# Patient Record
Sex: FEMALE | Race: WHITE | NOT HISPANIC OR LATINO | URBAN - METROPOLITAN AREA
[De-identification: names, ages, dates, MRNs, and addresses within clinical notes are randomized per-mention and may not be internally consistent; named-entity substitution may affect disease eponyms.]

---

## 2018-01-30 ENCOUNTER — OFFICE VISIT CONVERTED (OUTPATIENT)
Dept: PODIATRY | Facility: CLINIC | Age: 58
End: 2018-01-30
Attending: PODIATRIST

## 2018-02-13 ENCOUNTER — CONVERSION ENCOUNTER (OUTPATIENT)
Dept: PODIATRY | Facility: CLINIC | Age: 58
End: 2018-02-13

## 2018-02-13 ENCOUNTER — OFFICE VISIT CONVERTED (OUTPATIENT)
Dept: PODIATRY | Facility: CLINIC | Age: 58
End: 2018-02-13
Attending: PODIATRIST

## 2021-05-16 VITALS — OXYGEN SATURATION: 93 % | HEART RATE: 90 BPM | WEIGHT: 146 LBS | HEIGHT: 60 IN | BODY MASS INDEX: 28.66 KG/M2

## 2021-05-16 VITALS — HEART RATE: 94 BPM | BODY MASS INDEX: 27.05 KG/M2 | OXYGEN SATURATION: 92 % | HEIGHT: 62 IN | WEIGHT: 147 LBS

## 2024-01-05 NOTE — PROGRESS NOTES
Chief Complaint  Establish Care, Hypertension, and Depression    Subjective          Talia Reilly, 63 y.o. female presents to [facility]  History of Present Illness   As a new patient to establish care.  She is a previous patient of Dr. SARMAD Rodrigez.  She is accompanied by her , James Reilly.  Her O2 sats were 85% on arrival.  She is short of breath on exertion.  We placed oxygen at 2 L per nasal cannula.  Her O2 sats improved to 97%.  She has COPD.  She uses Anoro inhaler daily and albuterol inhaler only as needed.  She is a former smoker.    Hypertension: Her blood pressure stable on lisinopril 20 mg twice daily and carvedilol 12.5 mg twice daily.    Depression: Her PHQ-9 score today was 17.  She has had suicidal thoughts in the past but she denies any suicidal thoughts or plans now.  She states that she lost her only son a few years ago and that gets her down and depressed at times.  She does currently take fluoxetine 20 mg 3 capsules daily and she is on Abilify 5 mg 2 capsules daily.    She has chronic swelling of her right foot.  She states that Dr. Rodrigez had checked a uric acid and it was a little elevated and told her it was gout.  She denies pain in her foot though.    Previous lab work 6 months ago was reviewed and her GFR was noted to be 38.  She denies any known history of kidney disease.    PHQ-9 Depression Screening  Little interest or pleasure in doing things? 3-->nearly every day   Feeling down, depressed, or hopeless? 1-->several days   Trouble falling or staying asleep, or sleeping too much? 3-->nearly every day (not sleeping)   Feeling tired or having little energy? 3-->nearly every day   Poor appetite or overeating? 0-->not at all   Feeling bad about yourself - or that you are a failure or have let yourself or your family down? 3-->nearly every day   Trouble concentrating on things, such as reading the newspaper or watching television? 0-->not at all  "  Moving or speaking so slowly that other people could have noticed? Or the opposite - being so fidgety or restless that you have been moving around a lot more than usual? 3-->nearly every day (fidgety)   Thoughts that you would be better off dead, or of hurting yourself in some way? 1-->several days   PHQ-9 Total Score 17   If you checked off any problems, how difficult have these problems made it for you to do your work, take care of things at home, or get along with other people?            Tobacco Use: Medium Risk (1/8/2024)    Patient History     Smoking Tobacco Use: Former     Smokeless Tobacco Use: Never     Passive Exposure: Not on file      Objective   Vital Signs:   /82   Pulse 77   Temp 98.4 °F (36.9 °C)   Ht 157.5 cm (62\")   Wt 59.2 kg (130 lb 9.6 oz)   SpO2 97%   BMI 23.89 kg/m²       Current Outpatient Medications:     albuterol sulfate  (90 Base) MCG/ACT inhaler, Inhale 1 puff Every 4 (Four) Hours As Needed for Wheezing., Disp: , Rfl:     ARIPiprazole (ABILIFY) 10 MG tablet, Take 1 tablet by mouth Daily., Disp: 90 tablet, Rfl: 1    carvedilol (COREG) 12.5 MG tablet, Take 1 tablet by mouth 2 (Two) Times a Day With Meals., Disp: 180 tablet, Rfl: 1    FLUoxetine (PROzac) 20 MG capsule, Take 3 capsules by mouth Daily., Disp: 270 capsule, Rfl: 1    lisinopril (PRINIVIL,ZESTRIL) 20 MG tablet, Take 1 tablet by mouth 2 (Two) Times a Day., Disp: 180 tablet, Rfl: 1    Omega-3 Fatty Acids (OMEGA-3 PO), Take  by mouth., Disp: , Rfl:     Umeclidinium-Vilanterol (Anoro Ellipta) 62.5-25 MCG/ACT aerosol powder  inhaler, Inhale 1 puff Daily., Disp: 60 each, Rfl: 5   Past Medical History:   Diagnosis Date    Anxiety     COPD (chronic obstructive pulmonary disease) 1/8/2024    Depression     Hyperlipidemia     Hypertension     Scoliosis       Physical Exam  Vitals reviewed.   Constitutional:       Appearance: Normal appearance. She is well-developed.   Neck:      Thyroid: No thyroid mass, thyromegaly " or thyroid tenderness.   Cardiovascular:      Rate and Rhythm: Normal rate and regular rhythm.      Heart sounds: No murmur heard.     No friction rub. No gallop.   Pulmonary:      Effort: Pulmonary effort is normal.      Breath sounds: Examination of the right-lower field reveals rales. Rales present. No wheezing or rhonchi.   Musculoskeletal:      Right foot: Swelling present. No tenderness.   Lymphadenopathy:      Cervical: No cervical adenopathy.   Skin:     General: Skin is warm and dry.   Neurological:      Mental Status: She is alert and oriented to person, place, and time.      Cranial Nerves: No cranial nerve deficit.   Psychiatric:         Mood and Affect: Mood and affect normal.         Behavior: Behavior normal.         Thought Content: Thought content normal. Thought content does not include homicidal or suicidal ideation.         Judgment: Judgment normal.        Result Review :   {The following data was reviewed by DEANNA Pisano    CMP   CMP          7/19/2023    08:31   CMP   Glucose 102    BUN 21    Creatinine 1.54    EGFR 38.3    Sodium 138    Potassium 4.8    Chloride 102    Calcium 10.0    Total Protein 6.5    Albumin 4.3    Globulin 2.2    Total Bilirubin 0.3    Alkaline Phosphatase 66    AST (SGOT) 21    ALT (SGPT) 15    Albumin/Globulin Ratio 2.0    BUN/Creatinine Ratio 13.6    Anion Gap 10.6      CBC   CBC          7/19/2023    08:31   CBC   WBC 8.12    RBC 4.25    Hemoglobin 13.2    Hematocrit 39.6    MCV 93.2    MCH 31.1    MCHC 33.3    RDW 12.1    Platelets 216      LIPID   Lipid Panel          7/19/2023    08:31   Lipid Panel   Total Cholesterol 231    Triglycerides 64    HDL Cholesterol 108    VLDL Cholesterol 11    LDL Cholesterol  112    LDL/HDL Ratio 1.02      URACID   Uric Acid          7/19/2023    08:31   Common Labsle   Uric Acid 7.6             Assessment and Plan    Diagnoses and all orders for this visit:    1. Hypoxia (Primary)  Comments:  Due to hypoxia and  presentation, will have her get a CXR, will call with results. Orders for home O2 placed today. Advised to go to ER if worsening of symptoms.  Orders:  -     XR Chest PA & Lateral; Future  -     Oxygen Therapy    2. Chronic obstructive pulmonary disease, unspecified COPD type  Assessment & Plan:  She is currently using Anoro daily, albuterol as needed.  She is hypoxic on arrival today and was placed on O2 at 2 L per nasal cannula, O2 sats improved to 97% on 2 L.      Orders:  -     Umeclidinium-Vilanterol (Anoro Ellipta) 62.5-25 MCG/ACT aerosol powder  inhaler; Inhale 1 puff Daily.  Dispense: 60 each; Refill: 5  -     XR Chest PA & Lateral; Future  -     Oxygen Therapy    3. Primary hypertension  Assessment & Plan:  Hypertension is improving with treatment.  Continue current treatment regimen.  Continue current medications.  Blood pressure will be reassessed in 3 months.    Orders:  -     CBC Auto Differential  -     Comprehensive Metabolic Panel  -     Lipid Panel  -     lisinopril (PRINIVIL,ZESTRIL) 20 MG tablet; Take 1 tablet by mouth 2 (Two) Times a Day.  Dispense: 180 tablet; Refill: 1  -     carvedilol (COREG) 12.5 MG tablet; Take 1 tablet by mouth 2 (Two) Times a Day With Meals.  Dispense: 180 tablet; Refill: 1  -     TSH+Free T4    4. Localized swelling of right foot    5. Elevated uric acid in blood  Assessment & Plan:  Will recheck uric acid with labs today.     Orders:  -     Uric Acid    6. Decreased GFR  -     Comprehensive Metabolic Panel    7. Major depressive disorder, recurrent, moderate  Assessment & Plan:  Patient's depression is recurrent and is moderate without psychosis. Their depression is currently active and the condition is improving with treatment. This will be reassessed in 3 months. F/U as described:patient will continue current medication therapy.    Orders:  -     ARIPiprazole (ABILIFY) 10 MG tablet; Take 1 tablet by mouth Daily.  Dispense: 90 tablet; Refill: 1  -     FLUoxetine  (PROzac) 20 MG capsule; Take 3 capsules by mouth Daily.  Dispense: 270 capsule; Refill: 1  -     TSH+Free T4    8. Need for hepatitis C screening test  -     Hepatitis C Antibody    9. Shortness of breath  -     XR Chest PA & Lateral; Future  -     Oxygen Therapy        Follow Up   Return in about 3 months (around 4/8/2024) for 30 min apt for complex pt.  Patient was given instructions and counseling regarding her condition or for health maintenance advice. Please see specific information pulled into the AVS if appropriate.     Parts of this note are electronic transcriptions/translations of spoken language to printed text using the Dragon Dictation system.      Trinh Soto, APRN  01/08/2024

## 2024-01-08 ENCOUNTER — OFFICE VISIT (OUTPATIENT)
Dept: FAMILY MEDICINE CLINIC | Facility: CLINIC | Age: 64
End: 2024-01-08
Payer: COMMERCIAL

## 2024-01-08 VITALS
HEIGHT: 62 IN | TEMPERATURE: 98.4 F | WEIGHT: 130.6 LBS | BODY MASS INDEX: 24.03 KG/M2 | HEART RATE: 77 BPM | DIASTOLIC BLOOD PRESSURE: 82 MMHG | OXYGEN SATURATION: 97 % | SYSTOLIC BLOOD PRESSURE: 138 MMHG

## 2024-01-08 DIAGNOSIS — R06.02 SHORTNESS OF BREATH: ICD-10-CM

## 2024-01-08 DIAGNOSIS — Z11.59 NEED FOR HEPATITIS C SCREENING TEST: ICD-10-CM

## 2024-01-08 DIAGNOSIS — I10 PRIMARY HYPERTENSION: ICD-10-CM

## 2024-01-08 DIAGNOSIS — R09.02 HYPOXIA: Primary | ICD-10-CM

## 2024-01-08 DIAGNOSIS — R22.41 LOCALIZED SWELLING OF RIGHT FOOT: ICD-10-CM

## 2024-01-08 DIAGNOSIS — J44.9 CHRONIC OBSTRUCTIVE PULMONARY DISEASE, UNSPECIFIED COPD TYPE: ICD-10-CM

## 2024-01-08 DIAGNOSIS — R94.4 DECREASED GFR: ICD-10-CM

## 2024-01-08 DIAGNOSIS — E79.0 ELEVATED URIC ACID IN BLOOD: ICD-10-CM

## 2024-01-08 DIAGNOSIS — F33.1 MAJOR DEPRESSIVE DISORDER, RECURRENT, MODERATE: ICD-10-CM

## 2024-01-08 PROBLEM — M77.50 BURSITIS OF FOOT: Status: ACTIVE | Noted: 2018-02-13

## 2024-01-08 LAB
ALBUMIN SERPL-MCNC: 4.1 G/DL (ref 3.5–5.2)
ALBUMIN/GLOB SERPL: 1.4 G/DL
ALP SERPL-CCNC: 195 U/L (ref 39–117)
ALT SERPL W P-5'-P-CCNC: 33 U/L (ref 1–33)
ANION GAP SERPL CALCULATED.3IONS-SCNC: 15.1 MMOL/L (ref 5–15)
AST SERPL-CCNC: 58 U/L (ref 1–32)
BASOPHILS # BLD AUTO: 0.05 10*3/MM3 (ref 0–0.2)
BASOPHILS NFR BLD AUTO: 0.4 % (ref 0–1.5)
BILIRUB SERPL-MCNC: 0.4 MG/DL (ref 0–1.2)
BUN SERPL-MCNC: 21 MG/DL (ref 8–23)
BUN/CREAT SERPL: 13.3 (ref 7–25)
CALCIUM SPEC-SCNC: 9.7 MG/DL (ref 8.6–10.5)
CHLORIDE SERPL-SCNC: 99 MMOL/L (ref 98–107)
CHOLEST SERPL-MCNC: 224 MG/DL (ref 0–200)
CO2 SERPL-SCNC: 22.9 MMOL/L (ref 22–29)
CREAT SERPL-MCNC: 1.58 MG/DL (ref 0.57–1)
DEPRECATED RDW RBC AUTO: 45.5 FL (ref 37–54)
EGFRCR SERPLBLD CKD-EPI 2021: 36.9 ML/MIN/1.73
EOSINOPHIL # BLD AUTO: 0.17 10*3/MM3 (ref 0–0.4)
EOSINOPHIL NFR BLD AUTO: 1.5 % (ref 0.3–6.2)
ERYTHROCYTE [DISTWIDTH] IN BLOOD BY AUTOMATED COUNT: 13.9 % (ref 12.3–15.4)
GLOBULIN UR ELPH-MCNC: 3 GM/DL
GLUCOSE SERPL-MCNC: 97 MG/DL (ref 65–99)
HCT VFR BLD AUTO: 33.1 % (ref 34–46.6)
HCV AB SER DONR QL: NORMAL
HDLC SERPL-MCNC: 109 MG/DL (ref 40–60)
HGB BLD-MCNC: 10.9 G/DL (ref 12–15.9)
IMM GRANULOCYTES # BLD AUTO: 0.14 10*3/MM3 (ref 0–0.05)
IMM GRANULOCYTES NFR BLD AUTO: 1.2 % (ref 0–0.5)
LDLC SERPL CALC-MCNC: 105 MG/DL (ref 0–100)
LDLC/HDLC SERPL: 0.95 {RATIO}
LYMPHOCYTES # BLD AUTO: 1.47 10*3/MM3 (ref 0.7–3.1)
LYMPHOCYTES NFR BLD AUTO: 12.8 % (ref 19.6–45.3)
MCH RBC QN AUTO: 30 PG (ref 26.6–33)
MCHC RBC AUTO-ENTMCNC: 32.9 G/DL (ref 31.5–35.7)
MCV RBC AUTO: 91.2 FL (ref 79–97)
MONOCYTES # BLD AUTO: 0.56 10*3/MM3 (ref 0.1–0.9)
MONOCYTES NFR BLD AUTO: 4.9 % (ref 5–12)
NEUTROPHILS NFR BLD AUTO: 79.2 % (ref 42.7–76)
NEUTROPHILS NFR BLD AUTO: 9.07 10*3/MM3 (ref 1.7–7)
NRBC BLD AUTO-RTO: 0 /100 WBC (ref 0–0.2)
PLATELET # BLD AUTO: 278 10*3/MM3 (ref 140–450)
PMV BLD AUTO: 11.8 FL (ref 6–12)
POTASSIUM SERPL-SCNC: 5.2 MMOL/L (ref 3.5–5.2)
PROT SERPL-MCNC: 7.1 G/DL (ref 6–8.5)
RBC # BLD AUTO: 3.63 10*6/MM3 (ref 3.77–5.28)
SODIUM SERPL-SCNC: 137 MMOL/L (ref 136–145)
T4 FREE SERPL-MCNC: 1.41 NG/DL (ref 0.93–1.7)
TRIGL SERPL-MCNC: 58 MG/DL (ref 0–150)
TSH SERPL DL<=0.05 MIU/L-ACNC: 1.33 UIU/ML (ref 0.27–4.2)
URATE SERPL-MCNC: 10 MG/DL (ref 2.4–5.7)
VLDLC SERPL-MCNC: 10 MG/DL (ref 5–40)
WBC NRBC COR # BLD AUTO: 11.46 10*3/MM3 (ref 3.4–10.8)

## 2024-01-08 PROCEDURE — 80053 COMPREHEN METABOLIC PANEL: CPT | Performed by: NURSE PRACTITIONER

## 2024-01-08 PROCEDURE — 84439 ASSAY OF FREE THYROXINE: CPT | Performed by: NURSE PRACTITIONER

## 2024-01-08 PROCEDURE — 99204 OFFICE O/P NEW MOD 45 MIN: CPT | Performed by: NURSE PRACTITIONER

## 2024-01-08 PROCEDURE — 85025 COMPLETE CBC W/AUTO DIFF WBC: CPT | Performed by: NURSE PRACTITIONER

## 2024-01-08 PROCEDURE — 80061 LIPID PANEL: CPT | Performed by: NURSE PRACTITIONER

## 2024-01-08 PROCEDURE — 84550 ASSAY OF BLOOD/URIC ACID: CPT | Performed by: NURSE PRACTITIONER

## 2024-01-08 PROCEDURE — 36415 COLL VENOUS BLD VENIPUNCTURE: CPT | Performed by: NURSE PRACTITIONER

## 2024-01-08 PROCEDURE — 84443 ASSAY THYROID STIM HORMONE: CPT | Performed by: NURSE PRACTITIONER

## 2024-01-08 PROCEDURE — 86803 HEPATITIS C AB TEST: CPT | Performed by: NURSE PRACTITIONER

## 2024-01-08 RX ORDER — ARIPIPRAZOLE 5 MG/1
5 TABLET ORAL 2 TIMES DAILY
COMMUNITY
End: 2024-01-08 | Stop reason: SDUPTHER

## 2024-01-08 RX ORDER — LISINOPRIL 20 MG/1
20 TABLET ORAL 2 TIMES DAILY
COMMUNITY
End: 2024-01-08 | Stop reason: SDUPTHER

## 2024-01-08 RX ORDER — CARVEDILOL 12.5 MG/1
12.5 TABLET ORAL 2 TIMES DAILY WITH MEALS
Qty: 180 TABLET | Refills: 1 | Status: SHIPPED | OUTPATIENT
Start: 2024-01-08

## 2024-01-08 RX ORDER — ARIPIPRAZOLE 10 MG/1
10 TABLET ORAL DAILY
Qty: 90 TABLET | Refills: 1 | Status: SHIPPED | OUTPATIENT
Start: 2024-01-08

## 2024-01-08 RX ORDER — ALBUTEROL SULFATE 90 UG/1
1 AEROSOL, METERED RESPIRATORY (INHALATION) EVERY 4 HOURS PRN
COMMUNITY

## 2024-01-08 RX ORDER — CARVEDILOL 12.5 MG/1
12.5 TABLET ORAL 2 TIMES DAILY WITH MEALS
COMMUNITY
End: 2024-01-08 | Stop reason: SDUPTHER

## 2024-01-08 RX ORDER — UMECLIDINIUM BROMIDE AND VILANTEROL TRIFENATATE 62.5; 25 UG/1; UG/1
1 POWDER RESPIRATORY (INHALATION)
Qty: 60 EACH | Refills: 5 | Status: SHIPPED | OUTPATIENT
Start: 2024-01-08

## 2024-01-08 RX ORDER — LISINOPRIL 20 MG/1
20 TABLET ORAL 2 TIMES DAILY
Qty: 180 TABLET | Refills: 1 | Status: SHIPPED | OUTPATIENT
Start: 2024-01-08

## 2024-01-08 RX ORDER — FLUOXETINE HYDROCHLORIDE 20 MG/1
60 CAPSULE ORAL DAILY
Qty: 270 CAPSULE | Refills: 1 | Status: SHIPPED | OUTPATIENT
Start: 2024-01-08

## 2024-01-08 RX ORDER — FLUOXETINE HYDROCHLORIDE 20 MG/1
20 CAPSULE ORAL 3 TIMES DAILY
COMMUNITY
End: 2024-01-08 | Stop reason: SDUPTHER

## 2024-01-09 ENCOUNTER — TELEPHONE (OUTPATIENT)
Dept: FAMILY MEDICINE CLINIC | Facility: CLINIC | Age: 64
End: 2024-01-09
Payer: COMMERCIAL

## 2024-01-09 NOTE — TELEPHONE ENCOUNTER
Caller: CARLEY ROY    Relationship: Emergency Contact    Best call back number: [phone]    What was the call regarding: PATIENTS SPOUSE STATES THAT THE HOSPITAL WILL ARRANGE HOME OXYGEN FOR PATIENT.

## 2024-01-09 NOTE — ASSESSMENT & PLAN NOTE
She is currently using Anoro daily, albuterol as needed.  She is hypoxic on arrival today and was placed on O2 at 2 L per nasal cannula, O2 sats improved to 97% on 2 L.

## 2024-01-09 NOTE — TELEPHONE ENCOUNTER
So my worry is if we close this order then pt wont have o2 to go home with. I have talked to pt's  twice today before we saw this message due to it being sent to the wrong office. And silver stopped by the office stating they just needed the walk test from hospital and they would make sure she got o2 at home. We told pt to tell the nurse. Tamra is aware but adding her so she can put in a ticket for it being routed to the wrong office

## 2024-01-09 NOTE — TELEPHONE ENCOUNTER
Patient got admitted after I ordered the oxygen.  The oxygen will be ordered now through the hospital.  We can cancel the order that I put in.

## 2024-01-09 NOTE — TELEPHONE ENCOUNTER
PER LUCIE FROM [facility] [place] PATIENT NEEDS EITHER 6 MINUTE WALK TEST OR OVERNIGHT OX TEST

## 2024-01-10 NOTE — TELEPHONE ENCOUNTER
Patient  called states they are on their way to [facility] with patient per EMS. States they feel she may have some blockages and they transferred her there.

## 2024-01-10 NOTE — TELEPHONE ENCOUNTER
They need to do the test in the hospital because she will need to go home on oxygen and not wait to have to do an overnight pulse ox at home. My notes does show that her oxygen dropped to 85% when she first got to our office, placed on O2 at 2 L and sats improved to 97%. Please advise Lila of the above and they can contact the hospital also.

## 2024-01-10 NOTE — TELEPHONE ENCOUNTER
called back states she was admitted to [facility] and he will call us back when she is d/c and they would like to do an overnight pulse oxmetry test for 02

## 2024-01-18 ENCOUNTER — OFFICE VISIT (OUTPATIENT)
Dept: FAMILY MEDICINE CLINIC | Facility: CLINIC | Age: 64
End: 2024-01-18
Payer: COMMERCIAL

## 2024-01-18 VITALS
DIASTOLIC BLOOD PRESSURE: 58 MMHG | TEMPERATURE: 97.4 F | HEART RATE: 70 BPM | HEIGHT: 62 IN | OXYGEN SATURATION: 93 % | WEIGHT: 128 LBS | BODY MASS INDEX: 23.55 KG/M2 | SYSTOLIC BLOOD PRESSURE: 122 MMHG

## 2024-01-18 DIAGNOSIS — J44.9 CHRONIC OBSTRUCTIVE PULMONARY DISEASE, UNSPECIFIED COPD TYPE: ICD-10-CM

## 2024-01-18 DIAGNOSIS — Z09 HOSPITAL DISCHARGE FOLLOW-UP: Primary | ICD-10-CM

## 2024-01-18 DIAGNOSIS — J96.91 RESPIRATORY FAILURE WITH HYPOXIA, UNSPECIFIED CHRONICITY: ICD-10-CM

## 2024-01-18 DIAGNOSIS — Z23 NEED FOR PNEUMOCOCCAL 20-VALENT CONJUGATE VACCINATION: ICD-10-CM

## 2024-01-18 DIAGNOSIS — N18.32 STAGE 3B CHRONIC KIDNEY DISEASE: ICD-10-CM

## 2024-01-18 DIAGNOSIS — Z23 NEED FOR DIPHTHERIA-TETANUS-PERTUSSIS (TDAP) VACCINE: ICD-10-CM

## 2024-01-18 PROBLEM — E78.2 MIXED HYPERLIPIDEMIA: Status: ACTIVE | Noted: 2024-01-18

## 2024-01-18 PROBLEM — J18.9 PNEUMONIA OF LEFT LOWER LOBE DUE TO INFECTIOUS ORGANISM: Status: ACTIVE | Noted: 2024-01-08

## 2024-01-18 PROBLEM — M1A.00X0 CHRONIC GOUTY ARTHRITIS: Status: ACTIVE | Noted: 2024-01-18

## 2024-01-18 PROBLEM — J44.89 COPD (CHRONIC OBSTRUCTIVE PULMONARY DISEASE) WITH CHRONIC BRONCHITIS: Status: ACTIVE | Noted: 2024-01-18

## 2024-01-18 PROBLEM — M1A.9XX0 CHRONIC GOUTY ARTHRITIS: Status: ACTIVE | Noted: 2024-01-18

## 2024-01-18 PROBLEM — F32.9 MAJOR DEPRESSIVE DISORDER, SINGLE EPISODE, UNSPECIFIED: Status: ACTIVE | Noted: 2024-01-18

## 2024-01-18 PROBLEM — J96.01 ACUTE HYPOXIC RESPIRATORY FAILURE: Status: ACTIVE | Noted: 2024-01-10

## 2024-01-18 PROBLEM — M41.9 SCOLIOSIS: Status: ACTIVE | Noted: 2024-01-18

## 2024-01-18 PROBLEM — F51.01 PRIMARY INSOMNIA: Status: ACTIVE | Noted: 2024-01-18

## 2024-01-18 RX ORDER — FUROSEMIDE 40 MG/1
40 TABLET ORAL DAILY
COMMUNITY
Start: 2024-01-14

## 2024-01-18 NOTE — PROGRESS NOTES
Chief Complaint  No chief complaint on file.    Subjective          Talia Reilly, 63 y.o. female presents to [facility]  History of Present Illness       PHQ-2 Depression Screening  Little interest or pleasure in doing things?     Feeling down, depressed, or hopeless?     PHQ-2 Total Score            Tobacco Use: Medium Risk (1/18/2024)    Patient History     Smoking Tobacco Use: Former     Smokeless Tobacco Use: Never     Passive Exposure: Not on file      Objective   Vital Signs:   There were no vitals taken for this visit.      Current Outpatient Medications:     albuterol sulfate  (90 Base) MCG/ACT inhaler, Inhale 1 puff Every 4 (Four) Hours As Needed for Wheezing., Disp: , Rfl:     ARIPiprazole (ABILIFY) 10 MG tablet, Take 1 tablet by mouth Daily., Disp: 90 tablet, Rfl: 1    carvedilol (COREG) 12.5 MG tablet, Take 1 tablet by mouth 2 (Two) Times a Day With Meals., Disp: 180 tablet, Rfl: 1    FLUoxetine (PROzac) 20 MG capsule, Take 3 capsules by mouth Daily., Disp: 270 capsule, Rfl: 1    lisinopril (PRINIVIL,ZESTRIL) 20 MG tablet, Take 1 tablet by mouth 2 (Two) Times a Day., Disp: 180 tablet, Rfl: 1    Omega-3 Fatty Acids (OMEGA-3 PO), Take  by mouth., Disp: , Rfl:     Umeclidinium-Vilanterol (Anoro Ellipta) 62.5-25 MCG/ACT aerosol powder  inhaler, Inhale 1 puff Daily., Disp: 60 each, Rfl: 5   Past Medical History:   Diagnosis Date    Anxiety     COPD (chronic obstructive pulmonary disease) 1/8/2024    Depression     Hyperlipidemia     Hypertension     Scoliosis       Physical Exam   Result Review :   {The following data was reviewed by DEANNA Pisano     Renal Bilateral    Result Date: 1/10/2024  Chronic medical renal disease but no hydronephrosis to suggest obstruction. Electronically Signed: Juan Jose Fowler MD 2024/01/10 at 9:06 CST Reading Location ID and State: [address] / [place] Tel [phone], Service support  [phone], Fax [phone]    CT Chest  Without Contrast Diagnostic    Result Date: 1/9/2024  ASHD and old granulomatous disease. Minor bibasilar atelectasis. No focal infiltration. Electronically Signed: Junaid Mcmahan MD 2024/01/09 at 20:06 CST Reading Location ID and State: [address] / [place] Tel [phone], Service support  [phone], Fax [phone]    XR Chest 1 View    Result Date: 1/8/2024  Left lower lobe infiltrate or subsegmental atelectasis Electronically Signed: Ubaldo Mccord MD 2024/01/08 at 16:06 CST Reading Location ID and State: [id number] / [place] Tel [phone], Service support  [phone], Fax [phone]                   Assessment and Plan    There are no diagnoses linked to this encounter.    Follow Up   No follow-ups on file.  Patient was given instructions and counseling regarding her condition or for health maintenance advice. Please see specific information pulled into the AVS if appropriate.     Parts of this note are electronic transcriptions/translations of spoken language to printed text using the Dragon Dictation system.      Trinh Soto, APRN  01/18/2024

## 2024-01-18 NOTE — ASSESSMENT & PLAN NOTE
Renal condition is newly identified.  Continue current treatment regimen.  Continue current medications.  Patient is going to call to make appointment with nephrologist.  Renal condition will be reassessed in 3 months.

## 2024-01-18 NOTE — ASSESSMENT & PLAN NOTE
COPD is  stable .  COPD information handout given.  Follow up in 3 months, or sooner should new symptoms or problems arise.  Referral to pulmonology.

## 2024-01-18 NOTE — PROGRESS NOTES
Transitional Care Follow Up Visit  Subjective     Talia Reilly is a 63 y.o. female who presents for a transitional care management visit.    No transition of care management phone call was done post hospitalization.     Current outpatient and discharge medications have been reconciled for the patient.  Reviewed by: DEANNA Pisano    Tobacco Use: Medium Risk (1/18/2024)    Patient History     Smoking Tobacco Use: Former     Smokeless Tobacco Use: Never     Passive Exposure: Not on file            No data to display              Risk for Readmission (LACE) No data recorded    History of Present Illness   Course During Hospital Stay:    She states she presented to the hospital to get the chest x-ray that I had ordered on 1/8/2024.  She states when she went into the bathroom she passed out.  She was found to have pneumonia and was admitted to the hospital. She first was admitted at [facility] on 1/8/2024 for pneumonia but was transferred to [facility] on 1/10/2024.    She is a fairly new patient for me.  She had first come to see me on 1/8/2024 and was having some hypoxia.  I was sending her to get a chest x-ray and had ordered home oxygen.    She states she is feeling much better since she got out of the hospital.  She does not have any more hypoxia but she is using her oxygen continuously.  She does have COPD.  She is on Anoro inhaler daily and has albuterol inhaler to use as needed she does not have a pulmonologist.    She did have episode of congestive heart failure and was started on Lasix.  She does have a follow-up with cardiologist Dr. Dodd's office in [place].  She is supposed to call the nephrologist due to her chronic kidney disease that is newly identified.  She states she has been having some swelling in her lower extremities but that had improved after starting Lasix.  She states that she has had swelling in her feet in the past and was told it was gout but she  denies any pain.  She did have an elevated uric acid but she still denies any gout attacks.    HOSPITAL NOTES REVIEWED:  Admit date: 1/10/2024    Discharge date and time: 1/14/2024    Admitting Physician: Boubacar Garsia DO     Primary Care Provider: Trinh Soto APRN    Discharge Physician: Ottoniel Miranda MD    Discharge Diagnoses/Reason for Hospitalization:   Principal Problem:  Acute hypoxic respiratory failure (1/10/2024) (POA: Unknown)    Please refer to details of hospitalization in hospital course for additional diagnoses.    Hospital Course:  1. Acute hypoxic respiratory failure due to acute systolic CHF: symptomatically improved with diuresis. 6MW today to assess need for home O2     2. Acute systolic CHF: s/p IV diuretics. Cardiology followed. [facility] with normal coronaries. Onset of symptoms in November following COVID/flu-like illness. 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 Will continue BB and ACE-I on discharge. Daily lasix prescribed on discharge     3. CKD 3: Cr in July was 1.5 in July. Nephrology followed. Renal function stable at time of discharge.        Discharge exam notable for: awake, alert, breathing comfortably    Consults:   IP CONSULT TO CARDIOLOGY  IP CONSULT TO CASE MANAGEMENT  INPATIENT CONSULT TO PHARMACY  IP CONSULT TO NEPHROLOGY    Discharge Medications:  Current Discharge Medication List     START taking these medications   Details   furosemide (LASIX) 40 MG tablet Take 1 tablet by mouth daily.  Qty: 30 tablet, Refills: 0       CONTINUE these medications which have NOT CHANGED   Details   albuterol  (90 Base) MCG/ACT inhaler Inhale 2 puffs into the lungs every 6 (six) hours as needed.     ARIPiprazole (ABILIFY) 10 MG tablet Take 10 mg by mouth daily.     carvedilol (COREG) 12.5 MG tablet Take 12.5 mg by mouth 2 (two) times daily.     FLUoxetine (PROZAC) 20 MG capsule Take 60 mg by mouth daily.     lisinopril (PRINIVIL) 20 MG tablet Take 20 mg by  mouth daily.     OMEGA-3 FATTY ACIDS PO Take by mouth.     Umeclidinium-Vilanterol (ANORO ELLIPTA) 62.5-25 MCG/ACT AEPB Inhale 1 puff into the lungs daily.     Disposition:  Home    Discharge condition: Stable    Patient Instructions:   Follow-up Information   Zulema Ribeiro APRN .   Specialties: Nurse Practitioner, Cardiology  Contact information  David BUCKNER  [facility]  [place] [id number]  [phone]   The following portions of the patient's history were reviewed and updated as appropriate: allergies, current medications, past family history, past medical history, past social history, past surgical history, and problem list.    Review of Systems   Constitutional:  Negative for chills and fever.   Respiratory:  Negative for cough, shortness of breath and wheezing.    Cardiovascular:  Negative for chest pain, palpitations and leg swelling.   Genitourinary:  Negative for difficulty urinating.   Musculoskeletal:  Negative for arthralgias.   Neurological:  Negative for dizziness and weakness.   Psychiatric/Behavioral:  Negative for dysphoric mood and suicidal ideas. The patient is not nervous/anxious.        Current Outpatient Medications:     albuterol sulfate  (90 Base) MCG/ACT inhaler, Inhale 1 puff Every 4 (Four) Hours As Needed for Wheezing., Disp: , Rfl:     ARIPiprazole (ABILIFY) 10 MG tablet, Take 1 tablet by mouth Daily., Disp: 90 tablet, Rfl: 1    carvedilol (COREG) 12.5 MG tablet, Take 1 tablet by mouth 2 (Two) Times a Day With Meals., Disp: 180 tablet, Rfl: 1    FLUoxetine (PROzac) 20 MG capsule, Take 3 capsules by mouth Daily., Disp: 270 capsule, Rfl: 1    furosemide (LASIX) 40 MG tablet, Take 1 tablet by mouth Daily., Disp: , Rfl:     lisinopril (PRINIVIL,ZESTRIL) 20 MG tablet, Take 1 tablet by mouth 2 (Two) Times a Day., Disp: 180 tablet, Rfl: 1    Omega-3 Fatty Acids (OMEGA-3 PO), Take  by mouth., Disp: , Rfl:     Umeclidinium-Vilanterol (Anoro Ellipta) 62.5-25 MCG/ACT aerosol powder   inhaler, Inhale 1 puff Daily., Disp: 60 each, Rfl: 5  Objective   Physical Exam  Vitals reviewed.   Constitutional:       Appearance: Normal appearance. She is well-developed.   Neck:      Thyroid: No thyroid mass, thyromegaly or thyroid tenderness.   Cardiovascular:      Rate and Rhythm: Normal rate and regular rhythm.      Heart sounds: No murmur heard.     No friction rub. No gallop.   Pulmonary:      Effort: Pulmonary effort is normal.      Breath sounds: Normal breath sounds. No wheezing or rhonchi.      Comments: On continuous oxygen via nasal cannula.  Lymphadenopathy:      Cervical: No cervical adenopathy.   Skin:     General: Skin is warm and dry.   Neurological:      Mental Status: She is alert and oriented to person, place, and time.      Cranial Nerves: No cranial nerve deficit.   Psychiatric:         Mood and Affect: Mood and affect normal.         Behavior: Behavior normal.         Thought Content: Thought content normal. Thought content does not include homicidal or suicidal ideation.         Judgment: Judgment normal.       Common labs          1/11/2024    04:43 1/11/2024    12:18 1/12/2024    06:13 1/13/2024    04:38   Common Labs   Albumin  3.1         WBC 7.58      6.74     5.82       Hemoglobin 9.4      9.5     9.1       Hematocrit 30.0      31.0     29.8       Platelets 196      202     190       Uric Acid  12.4            Details          This result is from an external source.             Assessment & Plan   Problems Addressed this Visit          Genitourinary and Reproductive     Stage 3b chronic kidney disease     Renal condition is newly identified.  Continue current treatment regimen.  Continue current medications.  Patient is going to call to make appointment with nephrologist.  Renal condition will be reassessed in 3 months.         Relevant Medications    furosemide (LASIX) 40 MG tablet       Pulmonary and Pneumonias    COPD (chronic obstructive pulmonary disease)     COPD is  stable  .  COPD information handout given.  Follow up in 3 months, or sooner should new symptoms or problems arise.  Referral to pulmonology.             Relevant Orders    Ambulatory Referral to Pulmonology (Completed)    Respiratory failure with hypoxia     She is currently stable on continuous home oxygen.         Relevant Orders    Ambulatory Referral to Pulmonology (Completed)     Other Visit Diagnoses       Hospital discharge follow-up    -  Primary    No TCM call was made.    Need for pneumococcal 20-valent conjugate vaccination        Relevant Orders    Pneumococcal Conjugate Vaccine 20-Valent (PCV20) (Completed)    Need for diphtheria-tetanus-pertussis (Tdap) vaccine        Relevant Orders    Tdap Vaccine => 8yo IM (BOOSTRIX) (Completed)          Diagnoses         Codes Comments    Hospital discharge follow-up    -  Primary ICD-10-CM: Z09  ICD-9-CM: V67.59 No TCM call was made.    Chronic obstructive pulmonary disease, unspecified COPD type     ICD-10-CM: J44.9  ICD-9-CM: 496     Respiratory failure with hypoxia, unspecified chronicity     ICD-10-CM: J96.91  ICD-9-CM: 518.81     Stage 3b chronic kidney disease     ICD-10-CM: N18.32  ICD-9-CM: 585.3     Need for pneumococcal 20-valent conjugate vaccination     ICD-10-CM: Z23  ICD-9-CM: V03.82     Need for diphtheria-tetanus-pertussis (Tdap) vaccine     ICD-10-CM: Z23  ICD-9-CM: V06.1           Parts of this note are electronic transcriptions/translations of spoken language to printed text using the Dragon Dictation system.    Trinh Soto, APRN  01/18/2024

## 2024-02-09 RX ORDER — FUROSEMIDE 40 MG/1
40 TABLET ORAL DAILY
Qty: 30 TABLET | Refills: 2 | Status: SHIPPED | OUTPATIENT
Start: 2024-02-09

## 2024-02-09 NOTE — TELEPHONE ENCOUNTER
Caller: Tommie Talia L    Relationship: Self    Best call back number: [phone]    Requested Prescriptions:   Requested Prescriptions     Pending Prescriptions Disp Refills    furosemide (LASIX) 40 MG tablet       Sig: Take 1 tablet by mouth Daily.        Pharmacy where request should be sent: [address] [phone] [facility] [phone]      Last office visit with prescribing clinician: 1/18/2024   Last telemedicine visit with prescribing clinician: Visit date not found   Next office visit with prescribing clinician: 4/8/2024     Additional details provided by patient:     Does the patient have less than a 3 day supply:  [x] Yes  [] No    Would you like a call back once the refill request has been completed: [x] Yes [] No    If the office needs to give you a call back, can they leave a voicemail: [x] Yes [] No    Gee Purvis   02/09/24 12:13 EST

## 2024-02-16 ENCOUNTER — TELEPHONE (OUTPATIENT)
Dept: FAMILY MEDICINE CLINIC | Facility: CLINIC | Age: 64
End: 2024-02-16
Payer: COMMERCIAL

## 2024-03-26 ENCOUNTER — TELEPHONE (OUTPATIENT)
Dept: FAMILY MEDICINE CLINIC | Facility: CLINIC | Age: 64
End: 2024-03-26

## 2024-03-26 DIAGNOSIS — N18.32 STAGE 3B CHRONIC KIDNEY DISEASE: Primary | ICD-10-CM

## 2024-03-26 DIAGNOSIS — J44.9 CHRONIC OBSTRUCTIVE PULMONARY DISEASE, UNSPECIFIED COPD TYPE: ICD-10-CM

## 2024-03-26 RX ORDER — UMECLIDINIUM BROMIDE AND VILANTEROL TRIFENATATE 62.5; 25 UG/1; UG/1
1 POWDER RESPIRATORY (INHALATION)
Qty: 60 EACH | Refills: 5 | Status: SHIPPED | OUTPATIENT
Start: 2024-03-26

## 2024-03-26 NOTE — TELEPHONE ENCOUNTER
Caller: Talia Reilly    Relationship: Self    Best call back number: [phone]     Requested Prescriptions:   ANORO INHALER 62.5 25 MG        Pharmacy where request should be sent: [facility] - [address] - [phone] [facility] [phone] FX     Last office visit with prescribing clinician: 1/18/2024   Last telemedicine visit with prescribing clinician: Visit date not found   Next office visit with prescribing clinician: 4/11/2024     Additional details provided by patient: PATIENT STATED THAT SHE IS NEEDING MARKOS OWENS TO PROVIDE REFILLS FOR THIS MEDICATION THAT WAS WAS PROVIDED BY PREVIOUS PRIMARY CARE Gee Shane Rep   03/26/24 11:57 EDT

## 2024-03-26 NOTE — TELEPHONE ENCOUNTER
Caller: Talia Reilly    Relationship: Self    Best call back number: [phone] REQUESTING A CALL BACK    What is the medical concern/diagnosis: CHRONIC KIDNEY FAILURE    What specialty or service is being requested:  KIDNEY SPECIALIST     What is the provider, practice or medical service name:   DR BARBARA ARMENTA    What is the office phone number: [phone]    Any additional details: PATIENT STATED THAT AFTER A JANUARY HOSPITALIZATION AT [facility] SHE WAS INFORMED TO SEE THIS DOCTOR AFTER SEEING HIM IN THE HOSPITAL

## 2024-04-09 ENCOUNTER — TELEPHONE (OUTPATIENT)
Dept: FAMILY MEDICINE CLINIC | Facility: CLINIC | Age: 64
End: 2024-04-09
Payer: COMMERCIAL

## 2024-04-10 NOTE — PROGRESS NOTES
"Chief Complaint   Patient presents with    RECHECK     Hand Pain       Initial /78   Pulse 82   Temp 97.7  F (36.5  C)   Resp 18   Ht 1.676 m (5' 6\")   Wt 61.8 kg (136 lb 3.2 oz)   LMP 03/07/2003 (Approximate)   SpO2 94%   BMI 21.98 kg/m   Estimated body mass index is 21.98 kg/m  as calculated from the following:    Height as of this encounter: 1.676 m (5' 6\").    Weight as of this encounter: 61.8 kg (136 lb 3.2 oz).  Medication Review: complete    The next two questions are to help us understand your food security.  If you are feeling you need any assistance in this area, we have resources available to support you today.          1/18/2024   SDOH- Food Insecurity   Within the past 12 months, did you worry that your food would run out before you got money to buy more? N   Within the past 12 months, did the food you bought just not last and you didn t have money to get more? N         Health Care Directive:  Patient does not have a Health Care Directive or Living Will: Discussed advance care planning with patient; however, patient declined at this time.    Nicky Ratliff LPN      " Chief Complaint  Hypertension, Depression, and COPD    Subjective            Talia Reilly is a 63 y.o. female who presents to [facility]   History of Present Illness  She is here today for follow-up.  She is accompanied by her .    COPD: She is on Anoro inhaler and has albuterol inhaler to use as needed.  She states she is doing well on Anoro but it is expensive for her.  She saw pulmonologist Dr. Tommie Davies with [facility]. She is on continuous O2 at 2 lpm. Her O2 sats were slightly low at 84% when she arrived.  She turned the O2 up to 3 L/min and her O2 sats improved to 96%.    She has chronic kidney disease.  She has been referred to nephrology and has an appointment coming up.    Hypertension: Her blood pressure stable on lisinopril 20 mg twice daily and carvedilol 12.5 mg twice daily.    Depression/Anxiety:  She states that she lost her only son a few years ago and that gets her down and depressed at times.  She does currently take fluoxetine 20 mg 3 capsules (60 mg) daily and she is on Abilify 10 mg daily.    She is due for colorectal screening.  She does not want to do a colonoscopy.  She does not have any family history of colon cancer and denies any issues with her bowels.    She also needs RSV and shingles vaccines.      Tobacco Use: Medium Risk (4/11/2024)    Patient History     Smoking Tobacco Use: Former     Smokeless Tobacco Use: Never     Passive Exposure: Not on file      E-cigarette/Vaping    E-cigarette/Vaping Use Never User      E-cigarette/Vaping Substances     E-cigarette/Vaping Devices       Alcohol Use: Not At Risk (1/8/2024)    Received from [facility], [facility]    AUDIT-C     Frequency of Alcohol Consumption: Never     Average Number of Drinks: Patient does not drink     Frequency of Binge Drinking: Never         Objective   Vital Signs:   Vitals:    04/11/24 1045 04/11/24 1048   BP: 152/74 140/70   BP Location: Left arm   "  Patient Position: Sitting    Cuff Size: Adult    Pulse: 72 65   Temp: 97.7 °F (36.5 °C)    SpO2: (!) 84% 96%   Weight: 60 kg (132 lb 3.2 oz)    Height: 157.5 cm (62\")      Body mass index is 24.18 kg/m².    Wt Readings from Last 3 Encounters:   04/11/24 60 kg (132 lb 3.2 oz)   01/18/24 58.1 kg (128 lb)   01/08/24 59.2 kg (130 lb 9.6 oz)     BP Readings from Last 3 Encounters:   04/11/24 140/70   01/18/24 122/58   01/08/24 138/82       Health Maintenance   Topic Date Due    COLORECTAL CANCER SCREENING  Never done    ANNUAL PHYSICAL  Never done    PAP SMEAR  Never done    COVID-19 Vaccine (1 - 2023-24 season) 01/13/2025 (Originally 9/1/2023)    MAMMOGRAM  01/18/2025 (Originally 1960)    RSV Vaccine - Adults (1 - 1-dose 60+ series) 04/11/2025 (Originally 8/14/2021)    ZOSTER VACCINE (1 of 2) 04/11/2025 (Originally 8/14/2011)    INFLUENZA VACCINE  08/01/2024    LUNG CANCER SCREENING  01/09/2025    LIPID PANEL  01/12/2025    TDAP/TD VACCINES (3 - Td or Tdap) 01/18/2034    HEPATITIS C SCREENING  Completed    Pneumococcal Vaccine 0-64  Completed       /70   Pulse 65   Temp 97.7 °F (36.5 °C)   Ht 157.5 cm (62\")   Wt 60 kg (132 lb 3.2 oz)   SpO2 96%   BMI 24.18 kg/m²       Current Outpatient Medications:     albuterol sulfate  (90 Base) MCG/ACT inhaler, Inhale 1 puff Every 4 (Four) Hours As Needed for Wheezing., Disp: , Rfl:     ARIPiprazole (ABILIFY) 10 MG tablet, Take 1 tablet by mouth Daily., Disp: 90 tablet, Rfl: 1    carvedilol (COREG) 12.5 MG tablet, Take 1 tablet by mouth 2 (Two) Times a Day With Meals., Disp: 180 tablet, Rfl: 1    FLUoxetine (PROzac) 20 MG capsule, Take 3 capsules by mouth Daily., Disp: 270 capsule, Rfl: 1    furosemide (LASIX) 40 MG tablet, Take 1 tablet by mouth Daily., Disp: 30 tablet, Rfl: 2    lisinopril (PRINIVIL,ZESTRIL) 20 MG tablet, Take 1 tablet by mouth 2 (Two) Times a Day., Disp: 180 tablet, Rfl: 1    Omega-3 Fatty Acids (OMEGA-3 PO), Take  by mouth., Disp: , " Rfl:     Umeclidinium-Vilanterol (Anoro Ellipta) 62.5-25 MCG/ACT aerosol powder  inhaler, Inhale 1 puff Daily., Disp: 60 each, Rfl: 5    hydrOXYzine (ATARAX) 25 MG tablet, Take 0.5-1 tablets by mouth 3 (Three) Times a Day As Needed for Anxiety. Indications: Feeling Anxious, Disp: 90 tablet, Rfl: 2    RSVPreF3 Vac Recomb Adjuvanted (AREXVY) 120 MCG/0.5ML reconstituted suspension injection, Inject 0.5 mL into the appropriate muscle as directed by prescriber 1 (One) Time for 1 dose., Disp: 0.5 mL, Rfl: 0   Past Medical History:   Diagnosis Date    Anxiety     COPD (chronic obstructive pulmonary disease) 1/8/2024    Depression     Hyperlipidemia     Hypertension     Scoliosis         Physical Exam  Vitals reviewed.   Constitutional:       Appearance: Normal appearance. She is well-developed.   Neck:      Thyroid: No thyroid mass, thyromegaly or thyroid tenderness.   Cardiovascular:      Rate and Rhythm: Normal rate and regular rhythm.      Heart sounds: No murmur heard.     No friction rub. No gallop.   Pulmonary:      Effort: Pulmonary effort is normal.      Breath sounds: Normal breath sounds. No wheezing or rhonchi.   Lymphadenopathy:      Cervical: No cervical adenopathy.   Skin:     General: Skin is warm and dry.   Neurological:      Mental Status: She is alert and oriented to person, place, and time.      Cranial Nerves: No cranial nerve deficit.   Psychiatric:         Mood and Affect: Mood and affect normal.         Behavior: Behavior normal.         Thought Content: Thought content normal. Thought content does not include homicidal or suicidal ideation.         Judgment: Judgment normal.          Result Review :    The following data was reviewed by: DEANNA Pisano on 04/11/2024:  CMP   CMP          7/19/2023    08:31 1/8/2024    14:53 1/11/2024    12:18   CMP   Glucose 102  97     BUN 21  21     Creatinine 1.54  1.58     EGFR 38.3  36.9     Sodium 138  137     Potassium 4.8  5.2     Chloride 102  99      Calcium 10.0  9.7     Total Protein 6.5  7.1  5.6       Albumin 4.3  4.1  3.1       Globulin 2.2  3.0     Total Bilirubin 0.3  0.4     Alkaline Phosphatase 66  195     AST (SGOT) 21  58     ALT (SGPT) 15  33     Albumin/Globulin Ratio 2.0  1.4     BUN/Creatinine Ratio 13.6  13.3     Anion Gap 10.6  15.1        Details          This result is from an external source.             CBC   CBC          1/11/2024    04:43 1/12/2024    06:13 1/13/2024    04:38   CBC   WBC 7.58     6.74     5.82       RBC 3.12     3.22     3.04       Hemoglobin 9.4     9.5     9.1       Hematocrit 30.0     31.0     29.8       MCV 96.2     96.3     98.0       MCH 30.1     29.5     29.9       MCHC 31.3     30.6     30.5       RDW 15.0     15.0     15.1       Platelets 196     202     190          Details          This result is from an external source.             LIPID   Lipid Panel          7/19/2023    08:31 1/8/2024    14:53   Lipid Panel   Total Cholesterol 231  224    Triglycerides 64  58    HDL Cholesterol 108  109    VLDL Cholesterol 11  10    LDL Cholesterol  112  105    LDL/HDL Ratio 1.02  0.95      TSH   TSH          1/8/2024    14:53   TSH   TSH 1.330             US Renal Bilateral    Result Date: 1/10/2024  Chronic medical renal disease but no hydronephrosis to suggest obstruction. Electronically Signed: Juan Jose Fowler MD 2024/01/10 at 9:06 CST Reading Location ID and State: 994 / [place] Tel [phone], Service support  [phone], Fax [phone]    CT Chest Without Contrast Diagnostic    Result Date: 1/9/2024  ASHD and old granulomatous disease. Minor bibasilar atelectasis. No focal infiltration. Electronically Signed: Junaid Mcmahan MD 2024/01/09 at 20:06 CST Reading Location ID and State: 955 / [place] Tel [phone], Service support  [phone], Fax [phone]    XR Chest 1 View    Result Date: 1/8/2024  Left lower lobe infiltrate or subsegmental atelectasis Electronically Signed: Ubaldo Mccord MD 2024/01/08  at 16:06 CST Reading Location ID and State: [address] / [place] Tel [phone], Service support  [phone], Fax [phone]    Assessment & Plan  Chronic obstructive pulmonary disease, unspecified COPD type  COPD is stable.    Plan:  Continue same medication/s without change.    Discussed medication dosage, use, side effects, and goals of treatment in detail.    Discussed monitoring symptoms and use of quick-relief medications and maintenance medication.  Recommended that she discuss the cost of Anoro with her pulmonologist to see if there is something else that he could give her that is more cost effective for her.    Patient Treatment Goals:   symptom prevention, minimizing limitation in activity, prevention of exacerbations and use of ER/inpatient care, maintenance of optimal pulmonary function, and minimization of adverse effects of treatment    Followup at the next regular appointment.    SUMMER (generalized anxiety disorder)  Psychological condition is unchanged.  I will have her start hydroxyzine 25 mg, advised to start with half a tablet and may increase to a full tablet if needed, advised will cause drowsiness and not to take if she is going to be driving.  She will also continue fluoxetine 60 mg daily and Abilify 10 mg daily.  Psychological condition  will be reassessed in 3 months.  Major depressive disorder, recurrent, moderate  Patient's depression is a recurrent episode that is moderate without psychosis. Depression is active and stable.    Plan:   Continue current medication therapy     Followup at the next regular appointment.   Stage 3b chronic kidney disease  Renal condition is newly identified.  Continue current treatment regimen.  Referral to nephrology  Has an appointment set up with nephrology already.  Renal condition will be reassessed in 3 months.  Screen for colon cancer  We discussed Cologuard and she is agreeable to do Cologuard test.  Need for RSV immunization  We discussed RSV and  recommended for her.  Prescription sent to pharmacy.  Handout given    Orders Placed This Encounter   Procedures    Cologuard - Stool, Per Rectum     New Medications Ordered This Visit   Medications    hydrOXYzine (ATARAX) 25 MG tablet     Sig: Take 0.5-1 tablets by mouth 3 (Three) Times a Day As Needed for Anxiety. Indications: Feeling Anxious     Dispense:  90 tablet     Refill:  2    RSVPreF3 Vac Recomb Adjuvanted (AREXVY) 120 MCG/0.5ML reconstituted suspension injection     Sig: Inject 0.5 mL into the appropriate muscle as directed by prescriber 1 (One) Time for 1 dose.     Dispense:  0.5 mL     Refill:  0       BMI is within normal parameters. No other follow-up for BMI required.        Diagnosis Plan   1. Chronic obstructive pulmonary disease, unspecified COPD type        2. SUMMER (generalized anxiety disorder)  hydrOXYzine (ATARAX) 25 MG tablet      3. Major depressive disorder, recurrent, moderate        4. Stage 3b chronic kidney disease        5. Screen for colon cancer  Cologuard - Stool, Per Rectum      6. Need for RSV immunization  RSVPreF3 Vac Recomb Adjuvanted (AREXVY) 120 MCG/0.5ML reconstituted suspension injection            FOLLOW UP  Return in about 3 months (around 7/11/2024) for Next scheduled follow up.  Patient was given instructions and counseling regarding her condition or for health maintenance advice. Please see specific information pulled into the AVS if appropriate.       CURRENT & DISCONTINUED MEDICATIONS  Current Outpatient Medications   Medication Instructions    albuterol sulfate  (90 Base) MCG/ACT inhaler 1 puff, Inhalation, Every 4 Hours PRN    ARIPiprazole (ABILIFY) 10 mg, Oral, Daily    carvedilol (COREG) 12.5 mg, Oral, 2 Times Daily With Meals    FLUoxetine (PROZAC) 60 mg, Oral, Daily    furosemide (LASIX) 40 mg, Oral, Daily    hydrOXYzine (ATARAX) 12.5-25 mg, Oral, 3 Times Daily PRN    lisinopril (PRINIVIL,ZESTRIL) 20 mg, Oral, 2 Times Daily    Omega-3 Fatty Acids  (OMEGA-3 PO) Oral    RSVPreF3 Vac Recomb Adjuvanted (AREXVY) 120 mcg, Intramuscular, Once    Umeclidinium-Vilanterol (Anoro Ellipta) 62.5-25 MCG/ACT aerosol powder  inhaler 1 puff, Inhalation, Daily - RT       There are no discontinued medications.     Parts of this note are electronic transcriptions/translations of spoken language to printed text using the Dragon Dictation system.    DEANNA Pisano  04/11/24  12:33 EDT

## 2024-04-11 ENCOUNTER — OFFICE VISIT (OUTPATIENT)
Dept: FAMILY MEDICINE CLINIC | Facility: CLINIC | Age: 64
End: 2024-04-11
Payer: COMMERCIAL

## 2024-04-11 VITALS
OXYGEN SATURATION: 96 % | SYSTOLIC BLOOD PRESSURE: 140 MMHG | WEIGHT: 132.2 LBS | HEIGHT: 62 IN | HEART RATE: 65 BPM | DIASTOLIC BLOOD PRESSURE: 70 MMHG | TEMPERATURE: 97.7 F | BODY MASS INDEX: 24.33 KG/M2

## 2024-04-11 DIAGNOSIS — F41.1 GAD (GENERALIZED ANXIETY DISORDER): ICD-10-CM

## 2024-04-11 DIAGNOSIS — F33.1 MAJOR DEPRESSIVE DISORDER, RECURRENT, MODERATE: ICD-10-CM

## 2024-04-11 DIAGNOSIS — Z12.11 SCREEN FOR COLON CANCER: ICD-10-CM

## 2024-04-11 DIAGNOSIS — N18.32 STAGE 3B CHRONIC KIDNEY DISEASE: ICD-10-CM

## 2024-04-11 DIAGNOSIS — Z29.11 NEED FOR RSV IMMUNIZATION: ICD-10-CM

## 2024-04-11 DIAGNOSIS — J44.9 CHRONIC OBSTRUCTIVE PULMONARY DISEASE, UNSPECIFIED COPD TYPE: Primary | ICD-10-CM

## 2024-04-11 RX ORDER — HYDROXYZINE HYDROCHLORIDE 25 MG/1
12.5-25 TABLET, FILM COATED ORAL 3 TIMES DAILY PRN
Qty: 90 TABLET | Refills: 2 | Status: SHIPPED | OUTPATIENT
Start: 2024-04-11

## 2024-04-11 NOTE — ASSESSMENT & PLAN NOTE
Patient's depression is a recurrent episode that is moderate without psychosis. Depression is active and stable.    Plan:   Continue current medication therapy     Followup at the next regular appointment.

## 2024-04-11 NOTE — ASSESSMENT & PLAN NOTE
Renal condition is newly identified.  Continue current treatment regimen.  Referral to nephrology  Has an appointment set up with nephrology already.  Renal condition will be reassessed in 3 months.

## 2024-04-11 NOTE — ASSESSMENT & PLAN NOTE
Psychological condition is unchanged.  I will have her start hydroxyzine 25 mg, advised to start with half a tablet and may increase to a full tablet if needed, advised will cause drowsiness and not to take if she is going to be driving.  She will also continue fluoxetine 60 mg daily and Abilify 10 mg daily.  Psychological condition  will be reassessed in 3 months.

## 2024-04-11 NOTE — ASSESSMENT & PLAN NOTE
COPD is stable.    Plan:  Continue same medication/s without change.    Discussed medication dosage, use, side effects, and goals of treatment in detail.    Discussed monitoring symptoms and use of quick-relief medications and maintenance medication.  Recommended that she discuss the cost of Anoro with her pulmonologist to see if there is something else that he could give her that is more cost effective for her.    Patient Treatment Goals:   symptom prevention, minimizing limitation in activity, prevention of exacerbations and use of ER/inpatient care, maintenance of optimal pulmonary function, and minimization of adverse effects of treatment    Followup at the next regular appointment.

## 2024-05-13 RX ORDER — FUROSEMIDE 40 MG/1
40 TABLET ORAL DAILY
Qty: 30 TABLET | Refills: 2 | Status: SHIPPED | OUTPATIENT
Start: 2024-05-13

## 2024-05-13 NOTE — TELEPHONE ENCOUNTER
Caller: Talia Reilly ZACHARY    Relationship: Self    Best call back number: [phone]    Requested Prescriptions:   Requested Prescriptions     Pending Prescriptions Disp Refills    furosemide (LASIX) 40 MG tablet 30 tablet 2     Sig: Take 1 tablet by mouth Daily.        Pharmacy where request should be sent: [facility] [address] [place] [address] [phone] [facility] [phone]      Last office visit with prescribing clinician: 4/11/2024   Last telemedicine visit with prescribing clinician: Visit date not found   Next office visit with prescribing clinician: 7/11/2024     Additional details provided by patient: PATIENT HAS LESS THAN 3 DAY SUPPLY    Does the patient have less than a 3 day supply:  [x] Yes  [] No    Would you like a call back once the refill request has been completed: [] Yes [] No    If the office needs to give you a call back, can they leave a voicemail: [] Yes [] No    Gee Vann Rep   05/13/24 11:47 EDT

## 2024-05-17 DIAGNOSIS — J44.9 CHRONIC OBSTRUCTIVE PULMONARY DISEASE, UNSPECIFIED COPD TYPE: ICD-10-CM

## 2024-05-17 RX ORDER — UMECLIDINIUM BROMIDE AND VILANTEROL TRIFENATATE 62.5; 25 UG/1; UG/1
1 POWDER RESPIRATORY (INHALATION)
Qty: 60 EACH | Refills: 2 | Status: SHIPPED | OUTPATIENT
Start: 2024-05-17

## 2024-05-17 NOTE — TELEPHONE ENCOUNTER
Caller: Talia Reilly    Relationship: Self    Best call back number: [phone]    Requested Prescriptions:   Requested Prescriptions     Pending Prescriptions Disp Refills    Umeclidinium-Vilanterol (Anoro Ellipta) 62.5-25 MCG/ACT aerosol powder  inhaler 60 each 5     Sig: Inhale 1 puff Daily.        Pharmacy where request should be sent: [address] [phone] [facility] [phone]      Last office visit with prescribing clinician: 4/11/2024   Last telemedicine visit with prescribing clinician: Visit date not found   Next office visit with prescribing clinician: 7/11/2024         Does the patient have less than a 3 day supply:  [] Yes  [x] No      Gee Do Rep   05/17/24 11:19 EDT

## 2024-07-09 DIAGNOSIS — F33.1 MAJOR DEPRESSIVE DISORDER, RECURRENT, MODERATE: ICD-10-CM

## 2024-07-09 RX ORDER — FLUOXETINE HYDROCHLORIDE 20 MG/1
60 CAPSULE ORAL DAILY
Qty: 270 CAPSULE | Refills: 1 | OUTPATIENT
Start: 2024-07-09

## 2024-07-09 NOTE — TELEPHONE ENCOUNTER
Caller: CARLEY ROY    Relationship: Emergency Contact    Best call back number: [phone]     Requested Prescriptions:   Requested Prescriptions     Pending Prescriptions Disp Refills    FLUoxetine (PROzac) 20 MG capsule 270 capsule 1     Sig: Take 3 capsules by mouth Daily.        Pharmacy where request should be sent: [facility] [address] [place] [address] [phone] [facility] [phone]      Last office visit with prescribing clinician: 4/11/2024   Last telemedicine visit with prescribing clinician: Visit date not found   Next office visit with prescribing clinician: 7/11/2024     Additional details provided by patient: PATIENT HAS A FEW TABLETS LEFT BUT IS OUT OF REFILLS.     Does the patient have less than a 3 day supply:  [] Yes  [x] No    Would you like a call back once the refill request has been completed: [] Yes [] No    If the office needs to give you a call back, can they leave a voicemail: [] Yes [] No    Gee Mobley Rep   07/09/24 10:43 EDT

## 2024-07-11 ENCOUNTER — OFFICE VISIT (OUTPATIENT)
Dept: FAMILY MEDICINE CLINIC | Facility: CLINIC | Age: 64
End: 2024-07-11
Payer: COMMERCIAL

## 2024-07-11 ENCOUNTER — REFERRAL TRIAGE (OUTPATIENT)
Dept: CASE MANAGEMENT | Facility: OTHER | Age: 64
End: 2024-07-11
Payer: COMMERCIAL

## 2024-07-11 VITALS
BODY MASS INDEX: 25.19 KG/M2 | DIASTOLIC BLOOD PRESSURE: 51 MMHG | HEIGHT: 62 IN | RESPIRATION RATE: 16 BRPM | HEART RATE: 88 BPM | SYSTOLIC BLOOD PRESSURE: 137 MMHG | OXYGEN SATURATION: 92 % | TEMPERATURE: 97.7 F | WEIGHT: 136.9 LBS

## 2024-07-11 DIAGNOSIS — J44.9 CHRONIC OBSTRUCTIVE PULMONARY DISEASE, UNSPECIFIED COPD TYPE: ICD-10-CM

## 2024-07-11 DIAGNOSIS — N18.4 STAGE 4 CHRONIC KIDNEY DISEASE: ICD-10-CM

## 2024-07-11 DIAGNOSIS — J96.11 CHRONIC RESPIRATORY FAILURE WITH HYPOXIA: ICD-10-CM

## 2024-07-11 DIAGNOSIS — F33.1 MAJOR DEPRESSIVE DISORDER, RECURRENT, MODERATE: ICD-10-CM

## 2024-07-11 DIAGNOSIS — F41.1 GAD (GENERALIZED ANXIETY DISORDER): Primary | ICD-10-CM

## 2024-07-11 DIAGNOSIS — I10 PRIMARY HYPERTENSION: ICD-10-CM

## 2024-07-11 PROBLEM — I51.81 STRESS-INDUCED CARDIOMYOPATHY: Status: ACTIVE | Noted: 2024-01-13

## 2024-07-11 PROBLEM — J43.2 CENTRILOBULAR EMPHYSEMA: Status: ACTIVE | Noted: 2024-05-14

## 2024-07-11 PROBLEM — Z99.81 SUPPLEMENTAL OXYGEN DEPENDENT: Status: ACTIVE | Noted: 2024-05-14

## 2024-07-11 PROBLEM — I50.32 CHRONIC HEART FAILURE WITH PRESERVED EJECTION FRACTION (HFPEF): Status: ACTIVE | Noted: 2024-05-17

## 2024-07-11 PROCEDURE — 99214 OFFICE O/P EST MOD 30 MIN: CPT | Performed by: NURSE PRACTITIONER

## 2024-07-11 RX ORDER — ARIPIPRAZOLE 10 MG/1
10 TABLET ORAL DAILY
Qty: 90 TABLET | Refills: 1 | Status: SHIPPED | OUTPATIENT
Start: 2024-07-11

## 2024-07-11 RX ORDER — FLUOXETINE HYDROCHLORIDE 40 MG/1
80 CAPSULE ORAL DAILY
Qty: 180 CAPSULE | Refills: 1 | Status: SHIPPED | OUTPATIENT
Start: 2024-07-11

## 2024-07-11 RX ORDER — BUSPIRONE HYDROCHLORIDE 5 MG/1
5 TABLET ORAL 3 TIMES DAILY PRN
Qty: 90 TABLET | Refills: 2 | Status: SHIPPED | OUTPATIENT
Start: 2024-07-11

## 2024-07-11 RX ORDER — UMECLIDINIUM BROMIDE AND VILANTEROL TRIFENATATE 62.5; 25 UG/1; UG/1
1 POWDER RESPIRATORY (INHALATION)
Qty: 60 EACH | Refills: 5 | Status: SHIPPED | OUTPATIENT
Start: 2024-07-11

## 2024-07-11 NOTE — ASSESSMENT & PLAN NOTE
Psychological condition is worsening.  No improvement with Atarax, will have her stop Atarax.  I will start her on BuSpar 5 mg 3 times daily as needed.  We will also increase Prozac dose to 80 mg daily.  Psychological condition  will be reassessed in 3 months.

## 2024-07-11 NOTE — ASSESSMENT & PLAN NOTE
Renal condition is stable.  Continue current treatment regimen.  Continue current medications.  She will continue following with nephrology.  Renal condition will be reassessed in 3 months.

## 2024-07-11 NOTE — ASSESSMENT & PLAN NOTE
Patient's depression is a recurrent episode that is moderate without psychosis. Depression is active and unchanged.    Plan:   Medication  dose was adjusted;  I will increase Prozac dose to 80 mg once daily.  She will take two 40 mg capsules daily.    Followup in 3 months.

## 2024-07-11 NOTE — PROGRESS NOTES
Chief Complaint  Anxiety (Not any better than last time, saw no effect from the hydroxyzine. ), Hypertension (Checks it every day, this morning it was 121/71. ), COPD, and Depression    Subjective            Talia Reilly is a 63 y.o. female who presents to [facility]   History of Present Illness  3-month follow-up.  She is accompanied by her .    COPD: She is on Anoro inhaler and has albuterol inhaler to use as needed.  She states she is doing well on Anoro but it is expensive for her.  She saw pulmonologist Dr. Tommie Davies with [facility]. She is on continuous O2 at 2 lpm.  She is wanting a smaller portable oxygen that is battery-operated that she seen on TV.  She is going to get more information regarding this and have it sent to me.    She has chronic kidney disease.  She was referred to nephrology and has seen Dr. Quigley.  She does have a follow-up with Dr. Quigley next week.    Hypertension: Her blood pressure stable on lisinopril 20 mg twice daily and carvedilol 12.5 mg twice daily.    Depression/Anxiety:  stable on fluoxetine 20 mg 3 capsules (60 mg) daily and she is on Abilify 10 mg daily. I started her on atarax but she states it did not help her anxiety.     She is overdue for a Pap smear.  She does not want to do a Pap smear.  She states she has not had a Pap in 20 years.    Tobacco Use: Medium Risk (7/11/2024)    Patient History     Smoking Tobacco Use: Former     Smokeless Tobacco Use: Never     Passive Exposure: Not on file      E-cigarette/Vaping    E-cigarette/Vaping Use Never User      E-cigarette/Vaping Substances    Nicotine No     THC No     CBD No     Flavoring No      E-cigarette/Vaping Devices    Disposable No     Pre-filled or Refillable Cartridge No     Refillable Tank No     Pre-filled Pod No        Alcohol Use: Not At Risk (1/8/2024)    Received from [facility], [facility]    AUDIT-C     Frequency of Alcohol Consumption: Never      "Average Number of Drinks: Patient does not drink     Frequency of Binge Drinking: Never         Objective   Vital Signs:   Vitals:    07/11/24 1351   BP: 137/51   BP Location: Left arm   Patient Position: Sitting   Cuff Size: Adult   Pulse: 88   Resp: 16   Temp: 97.7 °F (36.5 °C)   TempSrc: Temporal   SpO2: 92%  Comment: on portable o2   Weight: 62.1 kg (136 lb 14.4 oz)   Height: 157.5 cm (62\")     Body mass index is 25.04 kg/m².    Wt Readings from Last 3 Encounters:   07/11/24 62.1 kg (136 lb 14.4 oz)   04/11/24 60 kg (132 lb 3.2 oz)   01/18/24 58.1 kg (128 lb)     BP Readings from Last 3 Encounters:   07/11/24 137/51   04/11/24 140/70   01/18/24 122/58       Health Maintenance   Topic Date Due    BMI FOLLOWUP  07/11/2024 (Originally 1960)    ANNUAL PHYSICAL  10/01/2024 (Originally 11/27/2023)    PAP SMEAR  10/01/2024 (Originally 11/27/2023)    COVID-19 Vaccine (1 - 2023-24 season) 01/13/2025 (Originally 9/1/2023)    MAMMOGRAM  01/18/2025 (Originally 1960)    ZOSTER VACCINE (1 of 2) 04/11/2025 (Originally 8/14/2011)    INFLUENZA VACCINE  08/01/2024    LUNG CANCER SCREENING  01/09/2025    LIPID PANEL  01/12/2025    COLORECTAL CANCER SCREENING  04/17/2027    TDAP/TD VACCINES (3 - Td or Tdap) 01/18/2034    HEPATITIS C SCREENING  Completed    Pneumococcal Vaccine 0-64  Completed       /51 (BP Location: Left arm, Patient Position: Sitting, Cuff Size: Adult)   Pulse 88   Temp 97.7 °F (36.5 °C) (Temporal)   Resp 16   Ht 157.5 cm (62\")   Wt 62.1 kg (136 lb 14.4 oz)   SpO2 92% Comment: on portable o2  BMI 25.04 kg/m²       Current Outpatient Medications:     albuterol sulfate  (90 Base) MCG/ACT inhaler, Inhale 1 puff Every 4 (Four) Hours As Needed for Wheezing., Disp: , Rfl:     ARIPiprazole (ABILIFY) 10 MG tablet, Take 1 tablet by mouth Daily., Disp: 90 tablet, Rfl: 1    carvedilol (COREG) 12.5 MG tablet, Take 1 tablet by mouth 2 (Two) Times a Day With Meals., Disp: 180 tablet, Rfl: 1    " FLUoxetine (PROzac) 40 MG capsule, Take 2 capsules by mouth Daily. Indications: Depression, anxiety, Disp: 180 capsule, Rfl: 1    furosemide (LASIX) 40 MG tablet, Take 1 tablet by mouth Daily., Disp: 30 tablet, Rfl: 2    lisinopril (PRINIVIL,ZESTRIL) 20 MG tablet, Take 1 tablet by mouth 2 (Two) Times a Day., Disp: 180 tablet, Rfl: 1    Omega-3 Fatty Acids (OMEGA-3 PO), Take  by mouth., Disp: , Rfl:     Umeclidinium-Vilanterol (Anoro Ellipta) 62.5-25 MCG/ACT aerosol powder  inhaler, Inhale 1 puff Daily., Disp: 60 each, Rfl: 5    busPIRone (BUSPAR) 5 MG tablet, Take 1 tablet by mouth 3 (Three) Times a Day As Needed (anxiety)., Disp: 90 tablet, Rfl: 2   Past Medical History:   Diagnosis Date    Anxiety     COPD (chronic obstructive pulmonary disease) 1/8/2024    Depression     Hyperlipidemia     Hypertension     Scoliosis         Physical Exam  Vitals reviewed.   Constitutional:       Appearance: Normal appearance. She is well-developed.   Neck:      Thyroid: No thyroid mass, thyromegaly or thyroid tenderness.   Cardiovascular:      Rate and Rhythm: Normal rate and regular rhythm.      Heart sounds: No murmur heard.     No friction rub. No gallop.   Pulmonary:      Effort: Pulmonary effort is normal.      Breath sounds: Normal breath sounds. No wheezing or rhonchi.   Lymphadenopathy:      Cervical: No cervical adenopathy.   Skin:     General: Skin is warm and dry.   Neurological:      Mental Status: She is alert and oriented to person, place, and time.      Cranial Nerves: No cranial nerve deficit.   Psychiatric:         Mood and Affect: Mood and affect normal.         Behavior: Behavior normal.         Thought Content: Thought content normal. Thought content does not include homicidal or suicidal ideation.         Judgment: Judgment normal.          Result Review :    The following data was reviewed by: DEANNA Pisano on 07/11/2024:  CMP   CMP          7/19/2023    08:31 1/8/2024    14:53 1/11/2024    12:18    CMP   Glucose 102  97     BUN 21  21     Creatinine 1.54  1.58     EGFR 38.3  36.9     Sodium 138  137     Potassium 4.8  5.2     Chloride 102  99     Calcium 10.0  9.7     Total Protein 6.5  7.1  5.6       Albumin 4.3  4.1  3.1       Globulin 2.2  3.0     Total Bilirubin 0.3  0.4     Alkaline Phosphatase 66  195     AST (SGOT) 21  58     ALT (SGPT) 15  33     Albumin/Globulin Ratio 2.0  1.4     BUN/Creatinine Ratio 13.6  13.3     Anion Gap 10.6  15.1        Details          This result is from an external source.             CBC   CBC          1/11/2024    04:43 1/12/2024    06:13 1/13/2024    04:38   CBC   WBC 7.58     6.74     5.82       RBC 3.12     3.22     3.04       Hemoglobin 9.4     9.5     9.1       Hematocrit 30.0     31.0     29.8       MCV 96.2     96.3     98.0       MCH 30.1     29.5     29.9       MCHC 31.3     30.6     30.5       RDW 15.0     15.0     15.1       Platelets 196     202     190          Details          This result is from an external source.             LIPID   Lipid Panel          7/19/2023    08:31 1/8/2024    14:53   Lipid Panel   Total Cholesterol 231  224    Triglycerides 64  58    HDL Cholesterol 108  109    VLDL Cholesterol 11  10    LDL Cholesterol  112  105    LDL/HDL Ratio 1.02  0.95      TSH   TSH          1/8/2024    14:53   TSH   TSH 1.330      Assessment & Plan  SUMMER (generalized anxiety disorder)  Psychological condition is worsening.  No improvement with Atarax, will have her stop Atarax.  I will start her on BuSpar 5 mg 3 times daily as needed.  We will also increase Prozac dose to 80 mg daily.  Psychological condition  will be reassessed in 3 months.  Chronic obstructive pulmonary disease, unspecified COPD type  COPD is stable.    Plan:  Continue same medication/s without change.    Discussed medication dosage, use, side effects, and goals of treatment in detail.    Warning signs of respiratory distress were reviewed with the patient.   Discussed distinction between  quick-relief and maintenance control medications..    Patient Treatment Goals:   symptom prevention, minimizing limitation in activity, prevention of exacerbations and use of ER/inpatient care, maintenance of optimal pulmonary function, and minimization of adverse effects of treatment    Followup in 3 months.    Stage 4 chronic kidney disease  Renal condition is stable.  Continue current treatment regimen.  Continue current medications.  She will continue following with nephrology.  Renal condition will be reassessed in 3 months.  Chronic respiratory failure with hypoxia  She is currently stable on continuous oxygen therapy at 2 L per nasal cannula.  She is following with pulmonology.  Major depressive disorder, recurrent, moderate  Patient's depression is a recurrent episode that is moderate without psychosis. Depression is active and  unchanged .    Plan:   Medication  dose was adjusted;  I will increase Prozac dose to 80 mg once daily.  She will take two 40 mg capsules daily.    Followup in 3 months.   Primary hypertension  Hypertension is stable and controlled  Continue current treatment regimen.  Blood pressure will be reassessed in 3 months.    Orders Placed This Encounter   Procedures    Ambulatory Referral to Chronic Care Management Barriers to Care, Disease Education, Care Coordination     New Medications Ordered This Visit   Medications    Umeclidinium-Vilanterol (Anoro Ellipta) 62.5-25 MCG/ACT aerosol powder  inhaler     Sig: Inhale 1 puff Daily.     Dispense:  60 each     Refill:  5    busPIRone (BUSPAR) 5 MG tablet     Sig: Take 1 tablet by mouth 3 (Three) Times a Day As Needed (anxiety).     Dispense:  90 tablet     Refill:  2    FLUoxetine (PROzac) 40 MG capsule     Sig: Take 2 capsules by mouth Daily. Indications: Depression, anxiety     Dispense:  180 capsule     Refill:  1    ARIPiprazole (ABILIFY) 10 MG tablet     Sig: Take 1 tablet by mouth Daily.     Dispense:  90 tablet     Refill:  1       BMI  is within normal parameters. No other follow-up for BMI required.        Diagnosis Plan   1. SUMMER (generalized anxiety disorder)  Ambulatory Referral to Chronic Care Management Barriers to Care, Disease Education, Care Coordination    FLUoxetine (PROzac) 40 MG capsule      2. Chronic obstructive pulmonary disease, unspecified COPD type  Umeclidinium-Vilanterol (Anoro Ellipta) 62.5-25 MCG/ACT aerosol powder  inhaler    Ambulatory Referral to Chronic Care Management Barriers to Care, Disease Education, Care Coordination      3. Stage 4 chronic kidney disease  Ambulatory Referral to Chronic Care Management Barriers to Care, Disease Education, Care Coordination      4. Chronic respiratory failure with hypoxia  Ambulatory Referral to Chronic Care Management Barriers to Care, Disease Education, Care Coordination      5. Major depressive disorder, recurrent, moderate  Ambulatory Referral to Chronic Care Management Barriers to Care, Disease Education, Care Coordination    FLUoxetine (PROzac) 40 MG capsule    ARIPiprazole (ABILIFY) 10 MG tablet      6. Primary hypertension              FOLLOW UP  Return in about 3 months (around 10/11/2024) for Annual physical and 3 mo f/u on dep/anxiety.  Patient was given instructions and counseling regarding her condition or for health maintenance advice. Please see specific information pulled into the AVS if appropriate.       CURRENT & DISCONTINUED MEDICATIONS  Current Outpatient Medications   Medication Instructions    albuterol sulfate  (90 Base) MCG/ACT inhaler 1 puff, Inhalation, Every 4 Hours PRN    ARIPiprazole (ABILIFY) 10 mg, Oral, Daily    busPIRone (BUSPAR) 5 mg, Oral, 3 Times Daily PRN    carvedilol (COREG) 12.5 mg, Oral, 2 Times Daily With Meals    FLUoxetine (PROZAC) 80 mg, Oral, Daily    furosemide (LASIX) 40 mg, Oral, Daily    lisinopril (PRINIVIL,ZESTRIL) 20 mg, Oral, 2 Times Daily    Omega-3 Fatty Acids (OMEGA-3 PO) Oral    Umeclidinium-Vilanterol (Anoro Ellipta)  62.5-25 MCG/ACT aerosol powder  inhaler 1 puff, Inhalation, Daily - RT       Medications Discontinued During This Encounter   Medication Reason    hydrOXYzine (ATARAX) 25 MG tablet Not Efficacious    Umeclidinium-Vilanterol (Anoro Ellipta) 62.5-25 MCG/ACT aerosol powder  inhaler Reorder    ARIPiprazole (ABILIFY) 10 MG tablet Reorder    FLUoxetine (PROzac) 20 MG capsule Reorder        Parts of this note are electronic transcriptions/translations of spoken language to printed text using the Dragon Dictation system.    Trinh Soto, DEANNA  07/11/24  15:45 EDT

## 2024-07-11 NOTE — ASSESSMENT & PLAN NOTE
She is currently stable on continuous oxygen therapy at 2 L per nasal cannula.  She is following with pulmonology.

## 2024-07-11 NOTE — ASSESSMENT & PLAN NOTE
COPD is stable.    Plan:  Continue same medication/s without change.    Discussed medication dosage, use, side effects, and goals of treatment in detail.    Warning signs of respiratory distress were reviewed with the patient.   Discussed distinction between quick-relief and maintenance control medications..    Patient Treatment Goals:   symptom prevention, minimizing limitation in activity, prevention of exacerbations and use of ER/inpatient care, maintenance of optimal pulmonary function, and minimization of adverse effects of treatment    Followup in 3 months.

## 2024-07-15 ENCOUNTER — PATIENT OUTREACH (OUTPATIENT)
Dept: CASE MANAGEMENT | Facility: OTHER | Age: 64
End: 2024-07-15
Payer: COMMERCIAL

## 2024-07-15 NOTE — OUTREACH NOTE
AMBULATORY CASE MANAGEMENT NOTE    Names and Relationships of Patient/Support Persons: Contact: Carmen Reillyorah ZACHARY; Relationship: Self -     Patient Outreach    Patient does not need CCM support per call with her but is needing help with out of pocket cost on her inhaler. She did get it filled Friday and the GoodRx coupon got her $100 off so she had to only pay a little over $400 for her inhaler.     I did research for her on [employer] patient assistance program and put in her income and family size and was able to get a support number for her to call at [phone]. She meets income requirements and does not have any pharmaceutical coverage for her insurance.     We discussed for her to also try to contact her pulmonologist for samples prior to paying a high out of pocket cost in the future and to try to get in touch with [employer] assistance program and if she has any issues to contact me at my office line and I gave her my direct number.     She is in [facility] and will support for this. I also inquired about the [facility] system PCP said she is trying to get and she reports that is already in process with her insurance company and [facility] and Pulmonology already did her walk test and testing needed for this. Just waiting to see if her insurance will cover this.     PCP updated via Epic chat.          Samira BRITO  Ambulatory Case Management    7/15/2024, 14:28 EDT

## 2024-07-29 ENCOUNTER — PATIENT OUTREACH (OUTPATIENT)
Dept: CASE MANAGEMENT | Facility: OTHER | Age: 64
End: 2024-07-29
Payer: COMMERCIAL

## 2024-07-29 NOTE — OUTREACH NOTE
AMBULATORY CASE MANAGEMENT NOTE    Names and Relationships of Patient/Support Persons: Contact: Talia Reilly; Relationship: Self -     Patient Outreach    Patient had left voicemail for call back to her this morning on my work phone. She reports she is getting her inhaler for free and they will mail to her and she needs a prescription from PCP. She also is needing support filling out her paperwork they sent her. She will come by office at [facility] on Thursday this week for support. I will call her then and set up a time. She is very appreciative of the support on her inhalers.         Samira BRITO  Ambulatory Case Management    7/29/2024, 13:35 EDT

## 2024-08-01 ENCOUNTER — PATIENT OUTREACH (OUTPATIENT)
Dept: CASE MANAGEMENT | Facility: OTHER | Age: 64
End: 2024-08-01
Payer: COMMERCIAL

## 2024-08-01 DIAGNOSIS — J44.9 CHRONIC OBSTRUCTIVE PULMONARY DISEASE, UNSPECIFIED COPD TYPE: ICD-10-CM

## 2024-08-01 RX ORDER — UMECLIDINIUM BROMIDE AND VILANTEROL TRIFENATATE 62.5; 25 UG/1; UG/1
1 POWDER RESPIRATORY (INHALATION)
Qty: 60 EACH | Refills: 5 | Status: SHIPPED | OUTPATIENT
Start: 2024-08-01

## 2024-08-01 NOTE — OUTREACH NOTE
AMBULATORY CASE MANAGEMENT NOTE    Names and Relationships of Patient/Support Persons: Contact: Talia Reilyl; Relationship: Self  Contact: Talia Reilly; Relationship: Self -     Patient Outreach    Called patient and she will be in today between 1:30 pm and 2:00 pm for getting assistance for her patient assistance paperwork.     Patient to office and assisted for PAP for Anora inhaler and for a handicap parking tag and placed on PCP desk to sign.     Faxed her completed patient assistance application to [facility] and gave her the signed paperwork from PCP for her handicap application for her tag and put all paperwork up front to be scanned into her chart.     Samira BRITO  Ambulatory Case Management    8/1/2024, 11:26 EDT

## 2024-08-07 RX ORDER — FUROSEMIDE 40 MG/1
40 TABLET ORAL DAILY
Qty: 30 TABLET | Refills: 2 | Status: SHIPPED | OUTPATIENT
Start: 2024-08-07

## 2024-08-07 RX ORDER — BUSPIRONE HYDROCHLORIDE 5 MG/1
5 TABLET ORAL 3 TIMES DAILY PRN
Qty: 90 TABLET | Refills: 2 | Status: SHIPPED | OUTPATIENT
Start: 2024-08-07

## 2024-08-07 NOTE — TELEPHONE ENCOUNTER
Caller: Talia Reilly ZACHARY    Relationship: Self    Best call back number: [phone]    Requested Prescriptions:   Requested Prescriptions     Pending Prescriptions Disp Refills    furosemide (LASIX) 40 MG tablet 30 tablet 2     Sig: Take 1 tablet by mouth Daily.    busPIRone (BUSPAR) 5 MG tablet 90 tablet 2     Sig: Take 1 tablet by mouth 3 (Three) Times a Day As Needed (anxiety).        Pharmacy where request should be sent: [address] [phone] [facility] [phone]      Last office visit with prescribing clinician: 7/11/2024   Last telemedicine visit with prescribing clinician: Visit date not found   Next office visit with prescribing clinician: 10/4/2024     Additional details provided by patient:     Does the patient have less than a 3 day supply:  [] Yes  [x] No    Would you like a call back once the refill request has been completed: [] Yes [] No    If the office needs to give you a call back, can they leave a voicemail: [] Yes [] No    Gee Rolle   08/07/24 12:40 EDT

## 2024-08-14 ENCOUNTER — PATIENT OUTREACH (OUTPATIENT)
Dept: CASE MANAGEMENT | Facility: OTHER | Age: 64
End: 2024-08-14
Payer: COMMERCIAL

## 2024-08-21 DIAGNOSIS — F33.1 MAJOR DEPRESSIVE DISORDER, RECURRENT, MODERATE: ICD-10-CM

## 2024-08-21 DIAGNOSIS — I10 PRIMARY HYPERTENSION: ICD-10-CM

## 2024-08-21 RX ORDER — ARIPIPRAZOLE 10 MG/1
10 TABLET ORAL DAILY
Qty: 90 TABLET | Refills: 0 | Status: SHIPPED | OUTPATIENT
Start: 2024-08-21

## 2024-08-21 RX ORDER — CARVEDILOL 12.5 MG/1
12.5 TABLET ORAL 2 TIMES DAILY WITH MEALS
Qty: 180 TABLET | Refills: 0 | Status: SHIPPED | OUTPATIENT
Start: 2024-08-21

## 2024-08-21 RX ORDER — LISINOPRIL 20 MG/1
20 TABLET ORAL 2 TIMES DAILY
Qty: 180 TABLET | Refills: 0 | Status: SHIPPED | OUTPATIENT
Start: 2024-08-21

## 2024-08-21 NOTE — TELEPHONE ENCOUNTER
Caller: Talia Reilly    Relationship: Self    Best call back number: [phone]     Requested Prescriptions:   Requested Prescriptions     Pending Prescriptions Disp Refills    carvedilol (COREG) 12.5 MG tablet 180 tablet 1     Sig: Take 1 tablet by mouth 2 (Two) Times a Day With Meals.    lisinopril (PRINIVIL,ZESTRIL) 20 MG tablet 180 tablet 1     Sig: Take 1 tablet by mouth 2 (Two) Times a Day.    ARIPiprazole (ABILIFY) 10 MG tablet 90 tablet 1     Sig: Take 1 tablet by mouth Daily.        Pharmacy where request should be sent: [facility] [address] [place] [address] [phone] [facility] [phone]      Last office visit with prescribing clinician: 7/11/2024   Last telemedicine visit with prescribing clinician: Visit date not found   Next office visit with prescribing clinician: 10/4/2024     Does the patient have less than a 3 day supply:  [x] Yes  [] No    Would you like a call back once the refill request has been completed: [] Yes [x] No    If the office needs to give you a call back, can they leave a voicemail: [] Yes [x] No    Gee Butler   08/21/24 08:57 EDT

## 2024-08-23 ENCOUNTER — PATIENT OUTREACH (OUTPATIENT)
Dept: CASE MANAGEMENT | Facility: OTHER | Age: 64
End: 2024-08-23
Payer: COMMERCIAL

## 2024-08-23 NOTE — OUTREACH NOTE
AMBULATORY CASE MANAGEMENT NOTE    Names and Relationships of Patient/Support Persons: Contact: CARLEY ROY; Relationship: Emergency Contact -     Patient's  brought in paperwork for financials and the denial letter from [employer] and reports she did not come in today because she is not feeling too well.     I faxed paperwork to [employer] and gave to front office to scan in.     Education Documentation  No documentation found.        Samira BRITO  Ambulatory Case Management    8/23/2024, 10:19 EDT

## 2024-09-03 ENCOUNTER — TELEPHONE (OUTPATIENT)
Dept: CASE MANAGEMENT | Facility: OTHER | Age: 64
End: 2024-09-03
Payer: COMMERCIAL

## 2024-09-03 NOTE — TELEPHONE ENCOUNTER
Discussion with PCP in office on Friday 8/30/24 after seeing her  for an appointment reported to me patient did get approval for her inhalers Anoro-Ellipta from [employer] patient assistance and they appreciated my support. Closing HRCM for goals met. PCP aware.

## 2024-10-08 DIAGNOSIS — F33.1 MAJOR DEPRESSIVE DISORDER, RECURRENT, MODERATE: ICD-10-CM

## 2024-10-08 DIAGNOSIS — F41.1 GAD (GENERALIZED ANXIETY DISORDER): ICD-10-CM

## 2024-10-08 RX ORDER — FLUOXETINE 40 MG/1
80 CAPSULE ORAL DAILY
Qty: 60 CAPSULE | Refills: 0 | Status: SHIPPED | OUTPATIENT
Start: 2024-10-08 | End: 2024-10-10 | Stop reason: SDUPTHER

## 2024-10-08 NOTE — TELEPHONE ENCOUNTER
Caller: CARLEY ROY    Relationship: Emergency Contact    Best call back number: [phone]    Requested Prescriptions:   Requested Prescriptions     Pending Prescriptions Disp Refills    FLUoxetine (PROzac) 40 MG capsule 180 capsule 1     Sig: Take 2 capsules by mouth Daily. Indications: Depression, anxiety        Pharmacy where request should be sent: [facility] [address] [place] [address] [phone] [facility] [phone]      Last office visit with prescribing clinician: 7/11/2024   Last telemedicine visit with prescribing clinician: Visit date not found   Next office visit with prescribing clinician: 10/10/2024     Additional details provided by patient: PATIENT HAS A 3 DAY SUPPLY    Does the patient have less than a 3 day supply:  [] Yes  [x] No    Would you like a call back once the refill request has been completed: [] Yes [] No    If the office needs to give you a call back, can they leave a voicemail: [] Yes [] No    Gee Vann Rep   10/08/24 12:33 EDT

## 2024-10-10 ENCOUNTER — OFFICE VISIT (OUTPATIENT)
Dept: FAMILY MEDICINE CLINIC | Facility: CLINIC | Age: 64
End: 2024-10-10
Payer: COMMERCIAL

## 2024-10-10 VITALS
WEIGHT: 137.7 LBS | HEART RATE: 67 BPM | OXYGEN SATURATION: 100 % | DIASTOLIC BLOOD PRESSURE: 80 MMHG | TEMPERATURE: 97.5 F | HEIGHT: 62 IN | SYSTOLIC BLOOD PRESSURE: 142 MMHG | BODY MASS INDEX: 25.34 KG/M2

## 2024-10-10 DIAGNOSIS — I10 PRIMARY HYPERTENSION: ICD-10-CM

## 2024-10-10 DIAGNOSIS — F33.1 MAJOR DEPRESSIVE DISORDER, RECURRENT, MODERATE: ICD-10-CM

## 2024-10-10 DIAGNOSIS — Z00.00 ANNUAL PHYSICAL EXAM: Primary | ICD-10-CM

## 2024-10-10 DIAGNOSIS — Z53.20 MAMMOGRAM DECLINED: ICD-10-CM

## 2024-10-10 DIAGNOSIS — F41.1 GAD (GENERALIZED ANXIETY DISORDER): ICD-10-CM

## 2024-10-10 PROCEDURE — 99396 PREV VISIT EST AGE 40-64: CPT | Performed by: NURSE PRACTITIONER

## 2024-10-10 RX ORDER — CARVEDILOL 12.5 MG/1
12.5 TABLET ORAL 2 TIMES DAILY WITH MEALS
Qty: 180 TABLET | Refills: 1 | Status: SHIPPED | OUTPATIENT
Start: 2024-10-10

## 2024-10-10 RX ORDER — ARIPIPRAZOLE 10 MG/1
10 TABLET ORAL DAILY
Qty: 90 TABLET | Refills: 1 | Status: SHIPPED | OUTPATIENT
Start: 2024-10-10

## 2024-10-10 RX ORDER — LISINOPRIL 20 MG/1
20 TABLET ORAL 2 TIMES DAILY
Qty: 180 TABLET | Refills: 1 | Status: SHIPPED | OUTPATIENT
Start: 2024-10-10

## 2024-10-10 RX ORDER — DOXYCYCLINE 100 MG/1
1 TABLET ORAL EVERY 12 HOURS SCHEDULED
COMMUNITY
Start: 2024-09-23

## 2024-10-10 RX ORDER — FLUOXETINE 40 MG/1
80 CAPSULE ORAL DAILY
Qty: 180 CAPSULE | Refills: 1 | Status: SHIPPED | OUTPATIENT
Start: 2024-10-10

## 2024-10-10 RX ORDER — FUROSEMIDE 40 MG
40 TABLET ORAL DAILY
Qty: 30 TABLET | Refills: 2 | Status: CANCELLED | OUTPATIENT
Start: 2024-10-10

## 2024-10-10 RX ORDER — PREDNISONE 10 MG/1
10 TABLET ORAL 2 TIMES DAILY
COMMUNITY
Start: 2024-09-23

## 2024-10-10 NOTE — ASSESSMENT & PLAN NOTE
Patient's depression is a recurrent episode that is moderate without psychosis. Depression is active and stable.    Plan:   Continue current medication therapy     Followup in 6 months.

## 2024-10-10 NOTE — PROGRESS NOTES
Chief Complaint  Annual Exam, Anxiety, and Depression    Subjective            Talia Reilly is a 64 y.o. female who presents to [facility]   History of Present Illness  Annual physical and 3-month follow-up.  She is accompanied by her .    COPD: She is on Anoro inhaler and has albuterol inhaler to use as needed.  She saw pulmonologist Dr. Tommie Davies with [facility]. She is on continuous O2 at 2 lpm.  She is on doxycycline and prednisone for URI per pulmonology.       She has chronic kidney disease.  She is following with nephrology Dr. Quigley.     Hypertension: Her blood pressure stable on lisinopril 20 mg twice daily and carvedilol 12.5 mg twice daily.  Blood pressure is above goal in office but she states her home blood pressures are always 120s over 70s.  She states she gets a little anxious at medical appointments.    Depression/Anxiety:  stable on fluoxetine 40 mg 2 capsules (80 mg) daily and she is on Abilify 10 mg daily.     Discussed and reviewed   Alcohol Misuse Screening and Counseling, occasional drink.   Bone Density Measurements  Cardiovascular Disease Screening Tests, fasting lipid panel, completed 1/12/2024, WNL.  Colorectal Cancer Screening,  Last Cologuard test was on 4/12/2024 was negative.  Counseling to Prevent Tobacco Use Talia Reilly  reports that she quit smoking about 10 months ago. Her smoking use included cigarettes. She started smoking about 47 years ago. She has a 23.5 pack-year smoking history. She has never used smokeless tobacco.     Lung Cancer Screening Counseling and Annual Screening for Lung Cancer with Low Dose Computed Tomography (LDCT), following pulmonology.   Diabetes Screening-Lab Order for either glucose or A1c, she has had a mildly elevated glucose but she has been on steroids.  She gets routine labs from nephrology.  Glaucoma screening, recommend routine vision exams.  Recommend routine biannual dental exams, has full  "dentures.   Hepatitis C Virus Screening, completed.   Human Immunodeficiency Virus (HIV) Screening, declines.   Screening for Sexually Transmitted Infections (STIs), declines.   Screening Mammography, refuses.   Screening Pelvic Examinations/Pap tests (Includes a clinical breast examination). She is overdue for a Pap smear.  She does not want to do a Pap smear.  She states she has not had a Pap in 20 years.    [facility] IMMUNIZATIONS: Influenza (refuses), Shingrix (refuses), and RSV (Respiratory Syncytial Virus), does not want to pay for the vaccine.     Tobacco Use: Medium Risk (10/10/2024)    Patient History     Smoking Tobacco Use: Former     Smokeless Tobacco Use: Never     Passive Exposure: Not on file      E-cigarette/Vaping    E-cigarette/Vaping Use Never User      E-cigarette/Vaping Substances    Nicotine No     THC No     CBD No     Flavoring No      E-cigarette/Vaping Devices    Disposable No     Pre-filled or Refillable Cartridge No     Refillable Tank No     Pre-filled Pod No        Alcohol Use: Not At Risk (1/8/2024)    [facility]     Frequency of Alcohol Consumption: Never     Average Number of Drinks: Patient does not drink     Frequency of Binge Drinking: Never         Objective   Vital Signs:   Vitals:    10/10/24 1449 10/10/24 1454   BP: 161/92 142/80   BP Location: Left arm    Patient Position: Sitting    Cuff Size: Adult    Pulse: 73 67   Temp: 97.5 °F (36.4 °C)    SpO2: 93% 100%   Weight: 62.5 kg (137 lb 11.2 oz)    Height: 157.5 cm (62\")      Body mass index is 25.19 kg/m².    Wt Readings from Last 3 Encounters:   10/10/24 62.5 kg (137 lb 11.2 oz)   07/11/24 62.1 kg (136 lb 14.4 oz)   04/11/24 60 kg (132 lb 3.2 oz)     BP Readings from Last 3 Encounters:   10/10/24 142/80   07/11/24 137/51   04/11/24 140/70       Health Maintenance   Topic Date Due    ANNUAL PHYSICAL  Never done    LUNG CANCER SCREENING  01/09/2025    PAP SMEAR  12/31/2024 " "(Originally 11/27/2023)    MAMMOGRAM  01/18/2025 (Originally 1960)    INFLUENZA VACCINE  03/31/2025 (Originally 8/1/2024)    ZOSTER VACCINE (1 of 2) 04/11/2025 (Originally 8/14/2011)    COVID-19 Vaccine (1 - 2023-24 season) 10/10/2025 (Originally 9/1/2024)    LIPID PANEL  01/12/2025    BMI FOLLOWUP  10/10/2025    COLORECTAL CANCER SCREENING  04/17/2027    TDAP/TD VACCINES (3 - Td or Tdap) 01/18/2034    HEPATITIS C SCREENING  Completed    Pneumococcal Vaccine 0-64  Completed       /80   Pulse 67   Temp 97.5 °F (36.4 °C)   Ht 157.5 cm (62\")   Wt 62.5 kg (137 lb 11.2 oz)   SpO2 100%   BMI 25.19 kg/m²       Current Outpatient Medications:     albuterol sulfate  (90 Base) MCG/ACT inhaler, Inhale 1 puff Every 4 (Four) Hours As Needed for Wheezing., Disp: , Rfl:     ARIPiprazole (ABILIFY) 10 MG tablet, Take 1 tablet by mouth Daily. Indications: Major Depressive Disorder, Disp: 90 tablet, Rfl: 1    busPIRone (BUSPAR) 5 MG tablet, Take 1 tablet by mouth 3 (Three) Times a Day As Needed (anxiety)., Disp: 90 tablet, Rfl: 2    carvedilol (COREG) 12.5 MG tablet, Take 1 tablet by mouth 2 (Two) Times a Day With Meals. Indications: High Blood Pressure, Disp: 180 tablet, Rfl: 1    doxycycline (ADOXA) 100 MG tablet, Take 1 tablet by mouth Every 12 (Twelve) Hours., Disp: , Rfl:     FLUoxetine (PROzac) 40 MG capsule, Take 2 capsules by mouth Daily. Indications: Depression, anxiety, Disp: 180 capsule, Rfl: 1    furosemide (LASIX) 40 MG tablet, Take 1 tablet by mouth Daily., Disp: 30 tablet, Rfl: 2    lisinopril (PRINIVIL,ZESTRIL) 20 MG tablet, Take 1 tablet by mouth 2 (Two) Times a Day. Indications: High Blood Pressure, Disp: 180 tablet, Rfl: 1    Omega-3 Fatty Acids (OMEGA-3 PO), Take  by mouth., Disp: , Rfl:     predniSONE (DELTASONE) 10 MG tablet, Take 1 tablet by mouth 2 (Two) Times a Day., Disp: , Rfl:     Umeclidinium-Vilanterol (Anoro Ellipta) 62.5-25 MCG/ACT aerosol powder  inhaler, Inhale 1 puff Daily., " Disp: 60 each, Rfl: 5   Past Medical History:   Diagnosis Date    Anxiety     COPD (chronic obstructive pulmonary disease) 1/8/2024    Depression     Hyperlipidemia     Hypertension     Scoliosis         Physical Exam  Vitals reviewed.   Constitutional:       Appearance: Normal appearance. She is well-developed.   Neck:      Thyroid: No thyroid mass, thyromegaly or thyroid tenderness.   Cardiovascular:      Rate and Rhythm: Normal rate and regular rhythm.      Heart sounds: No murmur heard.     No friction rub. No gallop.   Pulmonary:      Effort: Pulmonary effort is normal.      Breath sounds: Normal breath sounds. No wheezing or rhonchi.   Lymphadenopathy:      Cervical: No cervical adenopathy.   Skin:     General: Skin is warm and dry.   Neurological:      Mental Status: She is alert and oriented to person, place, and time.      Cranial Nerves: No cranial nerve deficit.   Psychiatric:         Mood and Affect: Mood and affect normal.         Behavior: Behavior normal.         Thought Content: Thought content normal. Thought content does not include homicidal or suicidal ideation.         Judgment: Judgment normal.          Result Review :    The following data was reviewed by: DEANNA Pisano on 10/10/2024:  Common Labs   Common labs          1/11/2024    04:43 1/11/2024    12:18 1/12/2024    06:13 1/13/2024    04:38   Common Labs   Albumin  3.1         WBC 7.58      6.74     5.82       Hemoglobin 9.4      9.5     9.1       Hematocrit 30.0      31.0     29.8       Platelets 196      202     190       Uric Acid  12.4            Details          This result is from an external source.             Assessment & Plan  Annual physical exam  Health maintenance and prevention discussed, handouts provided, see AVS.  SUMMER (generalized anxiety disorder)  Psychological condition is stable.  Continue current treatment regimen.  Psychological condition  will be reassessed in 6 months.  Major depressive disorder,  recurrent, moderate  Patient's depression is a recurrent episode that is moderate without psychosis. Depression is active and stable.    Plan:   Continue current medication therapy     Followup in 6 months.   Primary hypertension  Hypertension is stable and controlled, home blood pressures are normal.  Continue current treatment regimen.  Ambulatory blood pressure monitoring.  Blood pressure will be reassessed in 6 months.  Mammogram declined  Recommended that she get a mammogram but she refuses.     New Medications Ordered This Visit   Medications    FLUoxetine (PROzac) 40 MG capsule     Sig: Take 2 capsules by mouth Daily. Indications: Depression, anxiety     Dispense:  180 capsule     Refill:  1    ARIPiprazole (ABILIFY) 10 MG tablet     Sig: Take 1 tablet by mouth Daily. Indications: Major Depressive Disorder     Dispense:  90 tablet     Refill:  1    carvedilol (COREG) 12.5 MG tablet     Sig: Take 1 tablet by mouth 2 (Two) Times a Day With Meals. Indications: High Blood Pressure     Dispense:  180 tablet     Refill:  1    lisinopril (PRINIVIL,ZESTRIL) 20 MG tablet     Sig: Take 1 tablet by mouth 2 (Two) Times a Day. Indications: High Blood Pressure     Dispense:  180 tablet     Refill:  1           Diagnosis Plan   1. Annual physical exam        2. SUMMER (generalized anxiety disorder)  FLUoxetine (PROzac) 40 MG capsule      3. Major depressive disorder, recurrent, moderate  FLUoxetine (PROzac) 40 MG capsule    ARIPiprazole (ABILIFY) 10 MG tablet      4. Primary hypertension  carvedilol (COREG) 12.5 MG tablet    lisinopril (PRINIVIL,ZESTRIL) 20 MG tablet      5. Mammogram declined              FOLLOW UP  Return in about 6 months (around 4/10/2025) for Next scheduled follow up.  Patient was given instructions and counseling regarding her condition or for health maintenance advice. Please see specific information pulled into the AVS if appropriate.       CURRENT & DISCONTINUED MEDICATIONS  Current Outpatient  Medications   Medication Instructions    albuterol sulfate  (90 Base) MCG/ACT inhaler 1 puff, Inhalation, Every 4 Hours PRN    ARIPiprazole (ABILIFY) 10 mg, Oral, Daily    busPIRone (BUSPAR) 5 mg, Oral, 3 Times Daily PRN    carvedilol (COREG) 12.5 mg, Oral, 2 Times Daily With Meals    doxycycline (ADOXA) 100 MG tablet 1 tablet, Oral, Every 12 Hours Scheduled    FLUoxetine (PROZAC) 80 mg, Oral, Daily    furosemide (LASIX) 40 mg, Oral, Daily    lisinopril (PRINIVIL,ZESTRIL) 20 mg, Oral, 2 Times Daily    Omega-3 Fatty Acids (OMEGA-3 PO) Oral    predniSONE (DELTASONE) 10 mg, Oral, 2 Times Daily    Umeclidinium-Vilanterol (Anoro Ellipta) 62.5-25 MCG/ACT aerosol powder  inhaler 1 puff, Inhalation, Daily - RT       Medications Discontinued During This Encounter   Medication Reason    carvedilol (COREG) 12.5 MG tablet Reorder    lisinopril (PRINIVIL,ZESTRIL) 20 MG tablet Reorder    ARIPiprazole (ABILIFY) 10 MG tablet Reorder    FLUoxetine (PROzac) 40 MG capsule Reorder        Parts of this note are electronic transcriptions/translations of spoken language to printed text using the Dragon Dictation system.    DEANNA Pisano  10/10/24  15:23 EDT

## 2024-10-10 NOTE — ASSESSMENT & PLAN NOTE
Hypertension is stable and controlled, home blood pressures are normal.  Continue current treatment regimen.  Ambulatory blood pressure monitoring.  Blood pressure will be reassessed in 6 months.

## 2024-11-11 RX ORDER — FUROSEMIDE 40 MG/1
40 TABLET ORAL DAILY
Qty: 30 TABLET | Refills: 0 | Status: SHIPPED | OUTPATIENT
Start: 2024-11-11

## 2024-12-09 RX ORDER — FUROSEMIDE 40 MG/1
40 TABLET ORAL DAILY
Qty: 30 TABLET | Refills: 0 | Status: SHIPPED | OUTPATIENT
Start: 2024-12-09

## 2024-12-19 ENCOUNTER — TELEPHONE (OUTPATIENT)
Dept: FAMILY MEDICINE CLINIC | Facility: CLINIC | Age: 64
End: 2024-12-19
Payer: COMMERCIAL

## 2024-12-19 NOTE — TELEPHONE ENCOUNTER
PATIENT WAS SEEN AT [facility] ON 12/18/24 - DX WITH GOUT - SUGGEST SHE SEE YOU TO BE PLACED ON MEDICINE FOR GOUT - SCHEDULED HER AN APPT TO SEE YOU ON 12/26/24 BUT PATIENT SAID IF YOU MIGHT BE ABLE TO JUST CALL IN THE MEDICINE THAT WOULD BE GREAT - TOLD HER I WOULD LET HER KNOW ONE WAY OR THE OTHER

## 2024-12-20 NOTE — TELEPHONE ENCOUNTER
The prednisone they put her on should take care of the acute gout attack.  We will discuss preventative medicine when she comes in for her follow-up.

## 2025-01-09 ENCOUNTER — OFFICE VISIT (OUTPATIENT)
Dept: FAMILY MEDICINE CLINIC | Facility: CLINIC | Age: 65
End: 2025-01-09
Payer: COMMERCIAL

## 2025-01-09 VITALS
DIASTOLIC BLOOD PRESSURE: 88 MMHG | BODY MASS INDEX: 24.62 KG/M2 | TEMPERATURE: 98.2 F | OXYGEN SATURATION: 96 % | WEIGHT: 133.8 LBS | SYSTOLIC BLOOD PRESSURE: 170 MMHG | HEIGHT: 62 IN | HEART RATE: 79 BPM

## 2025-01-09 DIAGNOSIS — I10 PRIMARY HYPERTENSION: ICD-10-CM

## 2025-01-09 DIAGNOSIS — M10.9 GOUT OF FOOT, UNSPECIFIED CAUSE, UNSPECIFIED CHRONICITY, UNSPECIFIED LATERALITY: Primary | ICD-10-CM

## 2025-01-09 DIAGNOSIS — E79.0 HYPERURICEMIA: ICD-10-CM

## 2025-01-09 DIAGNOSIS — N18.4 STAGE 4 CHRONIC KIDNEY DISEASE: ICD-10-CM

## 2025-01-09 PROCEDURE — 99214 OFFICE O/P EST MOD 30 MIN: CPT | Performed by: NURSE PRACTITIONER

## 2025-01-09 RX ORDER — FEBUXOSTAT 40 MG/1
40 TABLET, FILM COATED ORAL DAILY
Qty: 30 TABLET | Refills: 2 | Status: SHIPPED | OUTPATIENT
Start: 2025-01-09

## 2025-01-09 RX ORDER — PREDNISONE 20 MG/1
20 TABLET ORAL 2 TIMES DAILY
Qty: 10 TABLET | Refills: 0 | Status: SHIPPED | OUTPATIENT
Start: 2025-01-09 | End: 2025-01-14

## 2025-01-09 RX ORDER — HYDRALAZINE HYDROCHLORIDE 25 MG/1
25 TABLET, FILM COATED ORAL 2 TIMES DAILY
Qty: 60 TABLET | Refills: 2 | Status: SHIPPED | OUTPATIENT
Start: 2025-01-09

## 2025-01-09 NOTE — ASSESSMENT & PLAN NOTE
Hypertension is uncontrolled  Medication changes per orders.  Ambulatory blood pressure monitoring.  I will have her stop lisinopril due to chronic kidney disease.  I will start her on hydralazine 25 mg twice daily.  She will continue the carvedilol 12.5 mg twice daily.  Blood pressure will be reassessedin 6 weeks.    Orders:    hydrALAZINE (APRESOLINE) 25 MG tablet; Take 1 tablet by mouth 2 (Two) Times a Day. Indications: High Blood Pressure     14-Dec-2017 02:01

## 2025-01-09 NOTE — ASSESSMENT & PLAN NOTE
Renal condition is stable.  Medication changes per orders.  She will continue following with nephrology.  Renal condition will be reassessed in 6 weeks.

## 2025-01-09 NOTE — PROGRESS NOTES
Addended by: FRANCY CAMACHO on: 2/14/2022 10:25 AM     Modules accepted: Orders     Chief Complaint  Gout (Feet-Bi-lateral ) and Hypertension    Subjective            Talia Reilly is a 64 y.o. female who presents to [facility]   History of Present Illness  She is here today with complaints of a flareup of gout in her feet.  She is accompanied by her  and is in a wheelchair due to the pain in her feet.  She states she cannot tolerate even the blankets to touch her feet.  She went to [facility] on 12/18/2024 due to gout.  Uric acid level was 12.6 in the ER.  C-reactive protein was elevated at 31.8.  WBCs were slightly elevated as well.  She was given prednisone 20 mg twice daily for 5 days.  She states that the prednisone does help with her gout but she keeps having recurring gout.  She does have chronic kidney disease with her last GFR 26. She does follow with nephrology.  She does have hypertension and is not well-controlled.  She states her nephrologist wanted her to stop the lisinopril due to her chronic kidney disease.  She states that her blood pressure is too high without it so she restarted taking her lisinopril.    PHQ-2 Depression Screening  Little interest or pleasure in doing things? Not at all   Feeling down, depressed, or hopeless? Not at all   PHQ-2 Total Score 0       Tobacco Use: Medium Risk (1/9/2025)    Patient History     Smoking Tobacco Use: Former     Smokeless Tobacco Use: Never     Passive Exposure: Not on file      E-cigarette/Vaping    E-cigarette/Vaping Use Never User      E-cigarette/Vaping Substances    Nicotine No     THC No     CBD No     Flavoring No      E-cigarette/Vaping Devices    Disposable No     Pre-filled or Refillable Cartridge No     Refillable Tank No     Pre-filled Pod No        Alcohol Use: Not At Risk (1/8/2024)    [facility]    AUDIT-C     Frequency of Alcohol Consumption: Never     Average Number of Drinks: Patient does not drink     Frequency of Binge  "Drinking: Never         Objective   Vital Signs:   Vitals:    01/09/25 1530 01/09/25 1537   BP: 160/90 170/88   BP Location: Left arm    Patient Position: Sitting    Cuff Size: Adult    Pulse: 92 79   Temp: 98.2 °F (36.8 °C)    SpO2: (!) 88% 96%   Weight: 60.7 kg (133 lb 12.8 oz)    Height: 157.5 cm (62\")    PainSc:   8    PainLoc: Foot  Comment: Bi-lateral      Body mass index is 24.47 kg/m².    Wt Readings from Last 3 Encounters:   01/09/25 60.7 kg (133 lb 12.8 oz)   10/10/24 62.5 kg (137 lb 11.2 oz)   07/11/24 62.1 kg (136 lb 14.4 oz)     BP Readings from Last 3 Encounters:   01/09/25 170/88   10/10/24 142/80   07/11/24 137/51       Health Maintenance   Topic Date Due    PAP SMEAR  Never done    LUNG CANCER SCREENING  01/09/2025    LIPID PANEL  01/12/2025    MAMMOGRAM  01/18/2025 (Originally 8/14/2001)    INFLUENZA VACCINE  03/31/2025 (Originally 7/1/2024)    ZOSTER VACCINE (1 of 2) 04/11/2025 (Originally 8/14/2011)    COVID-19 Vaccine (1 - 2024-25 season) 10/10/2025 (Originally 9/1/2024)    ANNUAL PHYSICAL  10/10/2025    COLORECTAL CANCER SCREENING  04/17/2027    TDAP/TD VACCINES (3 - Td or Tdap) 01/18/2034    HEPATITIS C SCREENING  Completed    Pneumococcal Vaccine 0-64  Completed       /88   Pulse 79   Temp 98.2 °F (36.8 °C)   Ht 157.5 cm (62\")   Wt 60.7 kg (133 lb 12.8 oz)   SpO2 96%   BMI 24.47 kg/m²       Current Outpatient Medications:     albuterol sulfate  (90 Base) MCG/ACT inhaler, Inhale 1 puff Every 4 (Four) Hours As Needed for Wheezing., Disp: , Rfl:     ARIPiprazole (ABILIFY) 10 MG tablet, Take 1 tablet by mouth Daily. Indications: Major Depressive Disorder, Disp: 90 tablet, Rfl: 1    busPIRone (BUSPAR) 5 MG tablet, Take 1 tablet by mouth 3 (Three) Times a Day As Needed (anxiety)., Disp: 90 tablet, Rfl: 2    carvedilol (COREG) 12.5 MG tablet, Take 1 tablet by mouth 2 (Two) Times a Day With Meals. Indications: High Blood Pressure, Disp: 180 tablet, Rfl: 1    FLUoxetine (PROzac) 40 " MG capsule, Take 2 capsules by mouth Daily. Indications: Depression, anxiety, Disp: 180 capsule, Rfl: 1    furosemide (LASIX) 40 MG tablet, Take 1 tablet by mouth once daily, Disp: 30 tablet, Rfl: 0    Omega-3 Fatty Acids (OMEGA-3 PO), Take  by mouth., Disp: , Rfl:     Umeclidinium-Vilanterol (Anoro Ellipta) 62.5-25 MCG/ACT aerosol powder  inhaler, Inhale 1 puff Daily., Disp: 60 each, Rfl: 5    febuxostat (Uloric) 40 MG tablet, Take 1 tablet by mouth Daily. Indications: Disorder of Excessive Uric Acid in the Blood, Gout, Disp: 30 tablet, Rfl: 2    hydrALAZINE (APRESOLINE) 25 MG tablet, Take 1 tablet by mouth 2 (Two) Times a Day. Indications: High Blood Pressure, Disp: 60 tablet, Rfl: 2    predniSONE (DELTASONE) 20 MG tablet, Take 1 tablet by mouth 2 (Two) Times a Day for 5 days. Indications: acute gout, Disp: 10 tablet, Rfl: 0   Past Medical History:   Diagnosis Date    Anxiety     COPD (chronic obstructive pulmonary disease) 1/8/2024    Depression     Hyperlipidemia     Hypertension     Scoliosis         Physical Exam  Vitals reviewed.   Constitutional:       Appearance: Normal appearance. She is well-developed.   Neck:      Thyroid: No thyroid mass, thyromegaly or thyroid tenderness.   Cardiovascular:      Rate and Rhythm: Normal rate and regular rhythm.      Heart sounds: No murmur heard.     No friction rub. No gallop.   Pulmonary:      Effort: Pulmonary effort is normal.      Breath sounds: Normal breath sounds. No wheezing or rhonchi.   Musculoskeletal:      Right foot: Swelling present.      Left foot: Swelling present.   Feet:      Right foot:      Skin integrity: Erythema present.      Left foot:      Skin integrity: Erythema present.   Lymphadenopathy:      Cervical: No cervical adenopathy.   Skin:     General: Skin is warm and dry.   Neurological:      Mental Status: She is alert and oriented to person, place, and time.      Cranial Nerves: No cranial nerve deficit.   Psychiatric:         Mood and Affect:  Mood and affect normal.         Behavior: Behavior normal.         Thought Content: Thought content normal. Thought content does not include homicidal or suicidal ideation.         Judgment: Judgment normal.          Result Review :    The following data was reviewed by: DEANNA Pisano on 01/09/2025:      Data reviewed : ER note from [facility] and lab results reviewed today.    Assessment & Plan  Gout of foot, unspecified cause, unspecified chronicity, unspecified laterality  She has acute on chronic gout attack.  I will treat her with prednisone 20 mg twice daily for 5 days.  We discussed chronic gout treatment.  I would like to avoid allopurinol due to her chronic kidney disease.  I was going to start her on colchicine 0.3 mg daily but it interacts with carvedilol.  I will start her on Uloric 40 mg daily.  She will be following up with me in 6 weeks and we will recheck labs at that time.  Orders:    predniSONE (DELTASONE) 20 MG tablet; Take 1 tablet by mouth 2 (Two) Times a Day for 5 days. Indications: acute gout    Hyperuricemia    Orders:    predniSONE (DELTASONE) 20 MG tablet; Take 1 tablet by mouth 2 (Two) Times a Day for 5 days. Indications: acute gout    febuxostat (Uloric) 40 MG tablet; Take 1 tablet by mouth Daily. Indications: Disorder of Excessive Uric Acid in the Blood, Gout    Primary hypertension  Hypertension is uncontrolled  Medication changes per orders.  Ambulatory blood pressure monitoring.  I will have her stop lisinopril due to chronic kidney disease.  I will start her on hydralazine 25 mg twice daily.  She will continue the carvedilol 12.5 mg twice daily.  Blood pressure will be reassessed in 6 weeks .    Orders:    hydrALAZINE (APRESOLINE) 25 MG tablet; Take 1 tablet by mouth 2 (Two) Times a Day. Indications: High Blood Pressure    Stage 4 chronic kidney disease  Renal condition is stable.  Medication changes per orders.  She will continue following with  nephrology.  Renal condition will be reassessed  in 6 weeks .            Diagnosis Plan   1. Gout of foot, unspecified cause, unspecified chronicity, unspecified laterality  predniSONE (DELTASONE) 20 MG tablet      2. Hyperuricemia  predniSONE (DELTASONE) 20 MG tablet    febuxostat (Uloric) 40 MG tablet      3. Primary hypertension  hydrALAZINE (APRESOLINE) 25 MG tablet      4. Stage 4 chronic kidney disease              FOLLOW UP  Return in about 6 weeks (around 2/20/2025) for Recheck Gout, HTN, labs.  Patient was given instructions and counseling regarding her condition or for health maintenance advice. Please see specific information pulled into the AVS if appropriate.       CURRENT & DISCONTINUED MEDICATIONS  Current Outpatient Medications   Medication Instructions    albuterol sulfate  (90 Base) MCG/ACT inhaler 1 puff, Every 4 Hours PRN    ARIPiprazole (ABILIFY) 10 mg, Oral, Daily    busPIRone (BUSPAR) 5 mg, Oral, 3 Times Daily PRN    carvedilol (COREG) 12.5 mg, Oral, 2 Times Daily With Meals    febuxostat (ULORIC) 40 mg, Oral, Daily    FLUoxetine (PROZAC) 80 mg, Oral, Daily    furosemide (LASIX) 40 mg, Oral, Daily    hydrALAZINE (APRESOLINE) 25 mg, Oral, 2 Times Daily    Omega-3 Fatty Acids (OMEGA-3 PO) Take  by mouth.    predniSONE (DELTASONE) 20 mg, Oral, 2 Times Daily    Umeclidinium-Vilanterol (Anoro Ellipta) 62.5-25 MCG/ACT aerosol powder  inhaler 1 puff, Inhalation, Daily - RT       Medications Discontinued During This Encounter   Medication Reason    doxycycline (ADOXA) 100 MG tablet *Therapy completed    predniSONE (DELTASONE) 10 MG tablet *Therapy completed    lisinopril (PRINIVIL,ZESTRIL) 20 MG tablet Other- See Medication Note        Parts of this note are electronic transcriptions/translations of spoken language to printed text using the Dragon Dictation system.    Trinh Soto, DEANNA  01/09/25  16:38 EST

## 2025-01-09 NOTE — ASSESSMENT & PLAN NOTE
She has acute on chronic gout attack.  I will treat her with prednisone 20 mg twice daily for 5 days.  We discussed chronic gout treatment.  I would like to avoid allopurinol due to her chronic kidney disease.  I was going to start her on colchicine 0.3 mg daily but it interacts with carvedilol.  I will start her on Uloric 40 mg daily.  She will be following up with me in 6 weeks and we will recheck labs at that time.  Orders:    predniSONE (DELTASONE) 20 MG tablet; Take 1 tablet by mouth 2 (Two) Times a Day for 5 days. Indications: acute gout

## 2025-01-09 NOTE — ASSESSMENT & PLAN NOTE
Orders:    predniSONE (DELTASONE) 20 MG tablet; Take 1 tablet by mouth 2 (Two) Times a Day for 5 days. Indications: acute gout    febuxostat (Uloric) 40 MG tablet; Take 1 tablet by mouth Daily. Indications: Disorder of Excessive Uric Acid in the Blood, Gout

## 2025-01-20 RX ORDER — BUSPIRONE HYDROCHLORIDE 5 MG/1
5 TABLET ORAL 3 TIMES DAILY PRN
Qty: 90 TABLET | Refills: 0 | Status: SHIPPED | OUTPATIENT
Start: 2025-01-20

## 2025-02-03 RX ORDER — FUROSEMIDE 40 MG/1
40 TABLET ORAL DAILY
Qty: 30 TABLET | Refills: 0 | Status: SHIPPED | OUTPATIENT
Start: 2025-02-03

## 2025-02-07 DIAGNOSIS — E79.0 HYPERURICEMIA: ICD-10-CM

## 2025-02-07 DIAGNOSIS — M10.9 GOUT OF FOOT, UNSPECIFIED CAUSE, UNSPECIFIED CHRONICITY, UNSPECIFIED LATERALITY: ICD-10-CM

## 2025-02-07 RX ORDER — PREDNISONE 20 MG/1
20 TABLET ORAL 2 TIMES DAILY
Qty: 10 TABLET | Refills: 0 | Status: SHIPPED | OUTPATIENT
Start: 2025-02-07 | End: 2025-02-12

## 2025-02-20 NOTE — PROGRESS NOTES
Chief Complaint  Hypertension and Gout    Subjective            Talia Reilly is a 64 y.o. female who presents to [facility]   History of Present Illness  She is here for 6-week follow-up on gout and hypertension.    She has been having chronic gout flareups.  I started her on Uloric as well as given her some prednisone.    Hypertension: She had uncontrolled hypertension but I had to stop her lisinopril due to her chronic kidney disease.  I started her on hydralazine 25 mg twice daily and advised her to continue the carvedilol 12.5 mg twice daily. Nephrology stopped the hydralazine and restarted her on lisinopril 20 mg daily.     She will need labs today: uric acid, A1c, BMP and lipid panel    She is due for mammogram, lung cancer screening and Pap smear.    Low-Dose CT: Lung Cancer Screening  Criteria:  Age 50-77 years of age (CMS) , 50-80 years of age ([employer]) and;  Patient Age: 64 y.o.  20 pack-year smoking history (# yrs smoked X avg #ppd = pack/yrs); if pt has quit smoking it must be within <15yrs and;  Asymptomatic for lung cancer  ICD-10 Codes:  History of smoking  Tobacco abuse/addiction  Z72.0 (current smoker)  Z87.891 (personal history of smoking/nicotine dependence) use with CMS   Patient Smoking History  Social History     Tobacco Use   Smoking Status Former    Current packs/day: 0.00    Average packs/day: 0.5 packs/day for 47.0 years (23.5 ttl pk-yrs)    Types: Cigarettes    Start date: 1976    Quit date: 2023    Years since quittin.2   Smokeless Tobacco Never     CPT Codes:  69595 low dose (must say low dose otherwise is CT without contrast)  / (for patients with [facility] and CMS)       Tobacco Use: Medium Risk (2025)    Patient History     Smoking Tobacco Use: Former     Smokeless Tobacco Use: Never     Passive Exposure: Not on file      E-cigarette/Vaping    E-cigarette/Vaping Use Never User      E-cigarette/Vaping  "Substances    Nicotine No     THC No     CBD No     Flavoring No      E-cigarette/Vaping Devices    Disposable No     Pre-filled or Refillable Cartridge No     Refillable Tank No     Pre-filled Pod No        Alcohol Use: Not At Risk (1/8/2024)    [facility] from [facility]    AUDIT-C     Frequency of Alcohol Consumption: Never     Average Number of Drinks: Patient does not drink     Frequency of Binge Drinking: Never         Objective   Vital Signs:   Vitals:    02/21/25 1349 02/21/25 1355   BP: 149/72 148/80   BP Location: Left arm    Patient Position: Sitting    Cuff Size: Adult    Pulse: 58    Temp: 99 °F (37.2 °C)    SpO2: 98%    Weight: 60.2 kg (132 lb 11.2 oz)    Height: 157.5 cm (62\")    PainSc: 2     PainLoc: Foot  Comment: Bi-lateral      Body mass index is 24.27 kg/m².    Wt Readings from Last 3 Encounters:   02/21/25 60.2 kg (132 lb 11.2 oz)   01/09/25 60.7 kg (133 lb 12.8 oz)   10/10/24 62.5 kg (137 lb 11.2 oz)     BP Readings from Last 3 Encounters:   02/21/25 148/80   01/09/25 170/88   10/10/24 142/80       Health Maintenance   Topic Date Due    MAMMOGRAM  Never done    PAP SMEAR  Never done    LIPID PANEL  01/12/2025    INFLUENZA VACCINE  03/31/2025 (Originally 7/1/2024)    LUNG CANCER SCREENING  04/01/2025 (Originally 1/9/2025)    ZOSTER VACCINE (1 of 2) 04/11/2025 (Originally 8/14/2011)    COVID-19 Vaccine (1 - 2024-25 season) 10/10/2025 (Originally 9/1/2024)    ANNUAL PHYSICAL  10/10/2025    COLORECTAL CANCER SCREENING  04/17/2027    TDAP/TD VACCINES (3 - Td or Tdap) 01/18/2034    HEPATITIS C SCREENING  Completed    Pneumococcal Vaccine 50+  Completed       /80   Pulse 58   Temp 99 °F (37.2 °C)   Ht 157.5 cm (62\")   Wt 60.2 kg (132 lb 11.2 oz)   SpO2 98%   BMI 24.27 kg/m²       Current Outpatient Medications:     albuterol sulfate  (90 Base) MCG/ACT inhaler, Inhale 1 puff Every 4 (Four) Hours As Needed for Wheezing., Disp: , Rfl:     ARIPiprazole (ABILIFY) " 10 MG tablet, Take 1 tablet by mouth Daily. Indications: Major Depressive Disorder, Disp: 90 tablet, Rfl: 1    busPIRone (BUSPAR) 5 MG tablet, TAKE 1 TABLET BY MOUTH THREE TIMES DAILY AS NEEDED FOR ANXIETY, Disp: 90 tablet, Rfl: 0    carvedilol (COREG) 12.5 MG tablet, Take 1 tablet by mouth 2 (Two) Times a Day With Meals. Indications: High Blood Pressure, Disp: 180 tablet, Rfl: 1    doxycycline (MONODOX) 100 MG capsule, Take 1 capsule by mouth Every 12 (Twelve) Hours., Disp: , Rfl:     febuxostat (Uloric) 40 MG tablet, Take 1 tablet by mouth Daily. Indications: Disorder of Excessive Uric Acid in the Blood, Gout, Disp: 30 tablet, Rfl: 2    FLUoxetine (PROzac) 40 MG capsule, Take 2 capsules by mouth Daily. Indications: Depression, anxiety, Disp: 180 capsule, Rfl: 1    furosemide (LASIX) 40 MG tablet, Take 1 tablet by mouth once daily, Disp: 30 tablet, Rfl: 0    lisinopril (PRINIVIL,ZESTRIL) 20 MG tablet, Take 1 tablet by mouth Daily. (Patient taking differently: Take 1 tablet by mouth 2 (Two) Times a Day.), Disp: , Rfl:     Omega-3 Fatty Acids (OMEGA-3 PO), Take  by mouth., Disp: , Rfl:     Umeclidinium-Vilanterol (Anoro Ellipta) 62.5-25 MCG/ACT aerosol powder  inhaler, Inhale 1 puff Daily., Disp: 60 each, Rfl: 5    amitriptyline (ELAVIL) 10 MG tablet, Take 1 tablet by mouth Every Night. Indications: feet pain, Disp: 30 tablet, Rfl: 2    predniSONE (DELTASONE) 20 MG tablet, Take 1 tablet by mouth 2 (Two) Times a Day for 5 days. Indications: foot pain/gout, Disp: 10 tablet, Rfl: 0   Past Medical History:   Diagnosis Date    Anxiety     COPD (chronic obstructive pulmonary disease) 1/8/2024    Depression     Hyperlipidemia     Hypertension     Scoliosis         Physical Exam  Vitals reviewed.   Constitutional:       Appearance: Normal appearance. She is well-developed.   Neck:      Thyroid: No thyroid mass, thyromegaly or thyroid tenderness.   Cardiovascular:      Rate and Rhythm: Normal rate and regular rhythm.       Heart sounds: No murmur heard.     No friction rub. No gallop.   Pulmonary:      Effort: Pulmonary effort is normal.      Breath sounds: Normal breath sounds. No wheezing or rhonchi.   Musculoskeletal:      Left foot: Swelling and tenderness present.   Lymphadenopathy:      Cervical: No cervical adenopathy.   Skin:     General: Skin is warm and dry.   Neurological:      Mental Status: She is alert and oriented to person, place, and time.      Cranial Nerves: No cranial nerve deficit.   Psychiatric:         Mood and Affect: Mood and affect normal.         Behavior: Behavior normal.         Thought Content: Thought content normal. Thought content does not include homicidal or suicidal ideation.         Judgment: Judgment normal.          Result Review :    The following data was reviewed by: DEANNA Pisano on 02/21/2025:           XR Chest 1 View    Result Date: 12/18/2024  COPD Electronically Signed: Garrett Lino MD 2024/12/18 at 11:54 CST Reading Location ID and State: [facility] / [place] Tel [phone], Service support  [phone], Fax [phone]    Assessment & Plan  Bilateral foot pain  We discussed since she has chronic bilateral foot pain that we will try her on some amitriptyline 10 mg at bedtime.  I will also have her get x-rays of her feet, she will get these at [facility].  Orders:    XR Foot 3+ View Left; Future    XR Foot 3+ View Right; Future    amitriptyline (ELAVIL) 10 MG tablet; Take 1 tablet by mouth Every Night. Indications: feet pain    Gout of foot, unspecified cause, unspecified chronicity, unspecified laterality  She think she is have another gout attack but not sure.  Prednisone does help so I will give her another round of prednisone, advised her to only take for the shortest time that she needs it.  We will check some labs today.  Orders:    XR Foot 3+ View Left; Future    XR Foot 3+ View Right; Future    CBC Auto Differential    Uric Acid    Sedimentation Rate     C-reactive protein    predniSONE (DELTASONE) 20 MG tablet; Take 1 tablet by mouth 2 (Two) Times a Day for 5 days. Indications: foot pain/gout    Stage 4 chronic kidney disease  Renal condition is stable.  Continue current treatment regimen.  She is managed by nephrology.    Orders:    Vitamin D,25-Hydroxy    Basic Metabolic Panel    Encounter for screening mammogram for malignant neoplasm of breast  We discussed importance of mammogram, order placed.  She will get this at the [facility].  Orders:    Mammo Screening Digital Tomosynthesis Bilateral With CAD; Future    Postmenopausal  We discussed that she needs to have a bone density scan especially with her history of smoking and steroid use.  Patient voices understanding.   She will get this at the [facility].  Orders:    DEXA Bone Density Axial; Future    Screening for lipid disorders  Fasting lipid panel today.  Orders:    Lipid Panel    Screening for thyroid disorder  TSH today with labs.  Orders:    TSH Rfx On Abnormal To Free T4       BMI is within normal parameters. No other follow-up for BMI required.        Diagnosis Plan   1. Bilateral foot pain  XR Foot 3+ View Left    XR Foot 3+ View Right    amitriptyline (ELAVIL) 10 MG tablet      2. Gout of foot, unspecified cause, unspecified chronicity, unspecified laterality  XR Foot 3+ View Left    XR Foot 3+ View Right    CBC Auto Differential    Uric Acid    Sedimentation Rate    C-reactive protein    predniSONE (DELTASONE) 20 MG tablet      3. Encounter for screening mammogram for malignant neoplasm of breast  Mammo Screening Digital Tomosynthesis Bilateral With CAD      4. Postmenopausal  DEXA Bone Density Axial      5. Screening for lipid disorders  Lipid Panel      6. Stage 4 chronic kidney disease  Vitamin D,25-Hydroxy    Basic Metabolic Panel      7. Screening for thyroid disorder  TSH Rfx On Abnormal To Free T4            FOLLOW UP  Return for Next scheduled follow  up.  Patient was given instructions and counseling regarding her condition or for health maintenance advice. Please see specific information pulled into the AVS if appropriate.       CURRENT & DISCONTINUED MEDICATIONS  Current Outpatient Medications   Medication Instructions    albuterol sulfate  (90 Base) MCG/ACT inhaler 1 puff, Every 4 Hours PRN    amitriptyline (ELAVIL) 10 mg, Oral, Nightly    ARIPiprazole (ABILIFY) 10 mg, Oral, Daily    busPIRone (BUSPAR) 5 mg, Oral, 3 Times Daily PRN, for anxiety    carvedilol (COREG) 12.5 mg, Oral, 2 Times Daily With Meals    doxycycline (MONODOX) 100 MG capsule 1 capsule, Every 12 Hours Scheduled    febuxostat (ULORIC) 40 mg, Oral, Daily    FLUoxetine (PROZAC) 80 mg, Oral, Daily    furosemide (LASIX) 40 mg, Oral, Daily    lisinopril (PRINIVIL,ZESTRIL) 20 mg, Daily    Omega-3 Fatty Acids (OMEGA-3 PO) Take  by mouth.    predniSONE (DELTASONE) 20 mg, Oral, 2 Times Daily    Umeclidinium-Vilanterol (Anoro Ellipta) 62.5-25 MCG/ACT aerosol powder  inhaler 1 puff, Inhalation, Daily - RT       Medications Discontinued During This Encounter   Medication Reason    hydrALAZINE (APRESOLINE) 25 MG tablet Discontinued by another clinician        Parts of this note are electronic transcriptions/translations of spoken language to printed text using the Dragon Dictation system.    Trinh Soto, DEANNA  02/21/25  14:22 EST

## 2025-02-21 ENCOUNTER — OFFICE VISIT (OUTPATIENT)
Dept: FAMILY MEDICINE CLINIC | Facility: CLINIC | Age: 65
End: 2025-02-21
Payer: COMMERCIAL

## 2025-02-21 VITALS
HEART RATE: 58 BPM | OXYGEN SATURATION: 98 % | DIASTOLIC BLOOD PRESSURE: 80 MMHG | BODY MASS INDEX: 24.42 KG/M2 | SYSTOLIC BLOOD PRESSURE: 148 MMHG | TEMPERATURE: 99 F | WEIGHT: 132.7 LBS | HEIGHT: 62 IN

## 2025-02-21 DIAGNOSIS — Z13.29 SCREENING FOR THYROID DISORDER: ICD-10-CM

## 2025-02-21 DIAGNOSIS — Z78.0 POSTMENOPAUSAL: ICD-10-CM

## 2025-02-21 DIAGNOSIS — M79.672 BILATERAL FOOT PAIN: Primary | ICD-10-CM

## 2025-02-21 DIAGNOSIS — Z12.31 ENCOUNTER FOR SCREENING MAMMOGRAM FOR MALIGNANT NEOPLASM OF BREAST: ICD-10-CM

## 2025-02-21 DIAGNOSIS — M10.9 GOUT OF FOOT, UNSPECIFIED CAUSE, UNSPECIFIED CHRONICITY, UNSPECIFIED LATERALITY: ICD-10-CM

## 2025-02-21 DIAGNOSIS — N18.4 STAGE 4 CHRONIC KIDNEY DISEASE: ICD-10-CM

## 2025-02-21 DIAGNOSIS — M79.671 BILATERAL FOOT PAIN: Primary | ICD-10-CM

## 2025-02-21 DIAGNOSIS — Z13.220 SCREENING FOR LIPID DISORDERS: ICD-10-CM

## 2025-02-21 LAB
25(OH)D3 SERPL-MCNC: 57.8 NG/ML (ref 30–100)
ANION GAP SERPL CALCULATED.3IONS-SCNC: 15 MMOL/L (ref 5–15)
BASOPHILS # BLD AUTO: 0.02 10*3/MM3 (ref 0–0.2)
BASOPHILS NFR BLD AUTO: 0.2 % (ref 0–1.5)
BUN SERPL-MCNC: 26 MG/DL (ref 8–23)
BUN/CREAT SERPL: 14.4 (ref 7–25)
CALCIUM SPEC-SCNC: 10.1 MG/DL (ref 8.6–10.5)
CHLORIDE SERPL-SCNC: 98 MMOL/L (ref 98–107)
CHOLEST SERPL-MCNC: 205 MG/DL (ref 0–200)
CO2 SERPL-SCNC: 24 MMOL/L (ref 22–29)
CREAT SERPL-MCNC: 1.81 MG/DL (ref 0.57–1)
CRP SERPL-MCNC: 14.25 MG/DL (ref 0–0.5)
DEPRECATED RDW RBC AUTO: 44.1 FL (ref 37–54)
EGFRCR SERPLBLD CKD-EPI 2021: 31.1 ML/MIN/1.73
EOSINOPHIL # BLD AUTO: 0.12 10*3/MM3 (ref 0–0.4)
EOSINOPHIL NFR BLD AUTO: 0.9 % (ref 0.3–6.2)
ERYTHROCYTE [DISTWIDTH] IN BLOOD BY AUTOMATED COUNT: 14.1 % (ref 12.3–15.4)
ERYTHROCYTE [SEDIMENTATION RATE] IN BLOOD: 39 MM/HR (ref 0–30)
GLUCOSE SERPL-MCNC: 85 MG/DL (ref 65–99)
HCT VFR BLD AUTO: 26.5 % (ref 34–46.6)
HDLC SERPL-MCNC: 76 MG/DL (ref 40–60)
HGB BLD-MCNC: 8.7 G/DL (ref 12–15.9)
IMM GRANULOCYTES # BLD AUTO: 0.15 10*3/MM3 (ref 0–0.05)
IMM GRANULOCYTES NFR BLD AUTO: 1.2 % (ref 0–0.5)
LDLC SERPL CALC-MCNC: 112 MG/DL (ref 0–100)
LDLC/HDLC SERPL: 1.44 {RATIO}
LYMPHOCYTES # BLD AUTO: 0.94 10*3/MM3 (ref 0.7–3.1)
LYMPHOCYTES NFR BLD AUTO: 7.4 % (ref 19.6–45.3)
MCH RBC QN AUTO: 28.3 PG (ref 26.6–33)
MCHC RBC AUTO-ENTMCNC: 32.8 G/DL (ref 31.5–35.7)
MCV RBC AUTO: 86.3 FL (ref 79–97)
MONOCYTES # BLD AUTO: 0.75 10*3/MM3 (ref 0.1–0.9)
MONOCYTES NFR BLD AUTO: 5.9 % (ref 5–12)
NEUTROPHILS NFR BLD AUTO: 10.7 10*3/MM3 (ref 1.7–7)
NEUTROPHILS NFR BLD AUTO: 84.4 % (ref 42.7–76)
NRBC BLD AUTO-RTO: 0 /100 WBC (ref 0–0.2)
PLATELET # BLD AUTO: 242 10*3/MM3 (ref 140–450)
PMV BLD AUTO: 11.5 FL (ref 6–12)
POTASSIUM SERPL-SCNC: 4.8 MMOL/L (ref 3.5–5.2)
RBC # BLD AUTO: 3.07 10*6/MM3 (ref 3.77–5.28)
SODIUM SERPL-SCNC: 137 MMOL/L (ref 136–145)
TRIGL SERPL-MCNC: 96 MG/DL (ref 0–150)
TSH SERPL DL<=0.05 MIU/L-ACNC: 0.7 UIU/ML (ref 0.27–4.2)
URATE SERPL-MCNC: 5 MG/DL (ref 2.4–5.7)
VLDLC SERPL-MCNC: 17 MG/DL (ref 5–40)
WBC NRBC COR # BLD AUTO: 12.68 10*3/MM3 (ref 3.4–10.8)

## 2025-02-21 PROCEDURE — 84550 ASSAY OF BLOOD/URIC ACID: CPT | Performed by: NURSE PRACTITIONER

## 2025-02-21 PROCEDURE — 80048 BASIC METABOLIC PNL TOTAL CA: CPT | Performed by: NURSE PRACTITIONER

## 2025-02-21 PROCEDURE — 80061 LIPID PANEL: CPT | Performed by: NURSE PRACTITIONER

## 2025-02-21 PROCEDURE — 85025 COMPLETE CBC W/AUTO DIFF WBC: CPT | Performed by: NURSE PRACTITIONER

## 2025-02-21 PROCEDURE — 85652 RBC SED RATE AUTOMATED: CPT | Performed by: NURSE PRACTITIONER

## 2025-02-21 PROCEDURE — 86140 C-REACTIVE PROTEIN: CPT | Performed by: NURSE PRACTITIONER

## 2025-02-21 PROCEDURE — 82306 VITAMIN D 25 HYDROXY: CPT | Performed by: NURSE PRACTITIONER

## 2025-02-21 PROCEDURE — 84443 ASSAY THYROID STIM HORMONE: CPT | Performed by: NURSE PRACTITIONER

## 2025-02-21 RX ORDER — DOXYCYCLINE 100 MG/1
1 CAPSULE ORAL EVERY 12 HOURS SCHEDULED
COMMUNITY
Start: 2025-01-23

## 2025-02-21 RX ORDER — PREDNISONE 20 MG/1
20 TABLET ORAL 2 TIMES DAILY
Qty: 10 TABLET | Refills: 0 | Status: SHIPPED | OUTPATIENT
Start: 2025-02-21 | End: 2025-02-26

## 2025-02-21 RX ORDER — LISINOPRIL 20 MG/1
20 TABLET ORAL DAILY
COMMUNITY

## 2025-02-21 RX ORDER — AMITRIPTYLINE HYDROCHLORIDE 10 MG/1
10 TABLET ORAL NIGHTLY
Qty: 30 TABLET | Refills: 2 | Status: SHIPPED | OUTPATIENT
Start: 2025-02-21

## 2025-02-21 NOTE — ASSESSMENT & PLAN NOTE
Renal condition is stable.  Continue current treatment regimen.  She is managed by nephrology.    Orders:    Vitamin D,25-Hydroxy    Basic Metabolic Panel

## 2025-02-21 NOTE — ASSESSMENT & PLAN NOTE
She think she is have another gout attack but not sure.  Prednisone does help so I will give her another round of prednisone, advised her to only take for the shortest time that she needs it.  We will check some labs today.  Orders:    XR Foot 3+ View Left; Future    XR Foot 3+ View Right; Future    CBC Auto Differential    Uric Acid    Sedimentation Rate    C-reactive protein    predniSONE (DELTASONE) 20 MG tablet; Take 1 tablet by mouth 2 (Two) Times a Day for 5 days. Indications: foot pain/gout

## 2025-02-28 DIAGNOSIS — F41.1 GAD (GENERALIZED ANXIETY DISORDER): Primary | ICD-10-CM

## 2025-02-28 RX ORDER — BUSPIRONE HYDROCHLORIDE 5 MG/1
5 TABLET ORAL 3 TIMES DAILY PRN
Qty: 90 TABLET | Refills: 0 | OUTPATIENT
Start: 2025-02-28

## 2025-02-28 RX ORDER — BUSPIRONE HYDROCHLORIDE 5 MG/1
5 TABLET ORAL 3 TIMES DAILY PRN
Qty: 90 TABLET | Refills: 2 | Status: SHIPPED | OUTPATIENT
Start: 2025-02-28

## 2025-04-01 DIAGNOSIS — E79.0 HYPERURICEMIA: ICD-10-CM

## 2025-04-01 RX ORDER — FEBUXOSTAT 40 MG/1
40 TABLET, FILM COATED ORAL DAILY
Qty: 30 TABLET | Refills: 2 | Status: SHIPPED | OUTPATIENT
Start: 2025-04-01

## 2025-04-14 ENCOUNTER — RESULTS FOLLOW-UP (OUTPATIENT)
Dept: FAMILY MEDICINE CLINIC | Facility: CLINIC | Age: 65
End: 2025-04-14
Payer: COMMERCIAL

## 2025-04-14 DIAGNOSIS — Z78.0 POSTMENOPAUSAL: ICD-10-CM

## 2025-04-14 DIAGNOSIS — Z12.31 ENCOUNTER FOR SCREENING MAMMOGRAM FOR MALIGNANT NEOPLASM OF BREAST: ICD-10-CM

## 2025-04-15 NOTE — PROGRESS NOTES
Density scan shows osteopenia which is the prestages to osteoporosis.  She is at moderate risk for fractures.  Recommendation is to take calcium with vitamin D supplement 500 to 600 mg twice daily and weightbearing exercise as well.  Repeat bone density scan in 2 years

## 2025-04-16 NOTE — PROGRESS NOTES
Chief Complaint  Hypertension, Hypothyroidism, Anxiety, and Depression    Patient or patient representative verbalized consent for the use of Ambient Listening during the visit with  DEANNA Pisano for chart documentation. 4/17/2025  14:06 EDT    Subjective            Talia Reilly is a 64 y.o. female who presents to [facility]   History of Present Illness  History of Present Illness  The patient is here today for a follow-up visit. She is accompanied by her .    She reports experiencing gastrointestinal discomfort as a side effect of amitriptyline, which was prescribed for her foot condition. However, she no longer requires this medication as her foot symptoms have improved.    Blood pressure monitoring at home has consistently shown elevated readings. Carvedilol 12.5 mg is taken twice a day with food for heart and blood pressure management. Lisinopril 20 mg twice a day is prescribed by her kidney doctor. Her nephrologist initially discontinued lisinopril due to renal concerns but later reintroduced it twice daily to manage hypertension and prevent stroke. CBD gummies have been beneficial in controlling her blood pressure.  Her blood pressure is elevated in the office today but she states she did not take any CBD Gummies today.  She does not want to have any blood pressure medicine doses increased.    Doxycycline and prednisone were prescribed by her pulmonologist for use in case of respiratory illness. She continues to use Anoro and has an albuterol inhaler available as needed. The doxycycline regimen has not yet been started. She is on chronic continuous oxygen.    She is currently on Uloric for gout management and recently received a refill. Omega-3 fish oil is taken regularly, and calcium gummies with vitamin D have been ordered and are expected to arrive today.    Lung cancer screening is due, with the last procedure performed in 01/2024. She quit smoking  on 2023 after a 47-year habit of half a pack per day. Consideration is being given to receiving the shingles vaccine, and she is due for a Pap smear.    Abilify 10 mg for depression and BuSpar for anxiety are reported to be working well.  I had increased her Prozac dose to 80 mg but she has decreased it back to 40 mg, which she feels is a good dose along with the Abilify.    Lasix is taken as needed for foot swelling, which was increased to twice daily by her pulmonologist. Currently, it is taken once daily but the dose is increased to twice daily when swelling worsens.    SOCIAL HISTORY  The patient quit smoking on 2023 after smoking for about 47 years at half a pack per day.      Low-Dose CT: Lung Cancer Screening  Criteria:  Age 50-77 years of age (CMS) , 50-80 years of age ([employer]) and;  Patient Age: 64 y.o.  20 pack-year smoking history (# yrs smoked X avg #ppd = pack/yrs); if pt has quit smoking it must be within <15yrs and;  Asymptomatic for lung cancer  ICD-10 Codes:  History of smoking  Tobacco abuse/addiction  Z72.0 (current smoker)  Z87.891 (personal history of smoking/nicotine dependence) use with CMS   Patient Smoking History  Social History     Tobacco Use   Smoking Status Former    Current packs/day: 0.00    Average packs/day: 0.5 packs/day for 47.0 years (23.5 ttl pk-yrs)    Types: Cigarettes    Start date: 1976    Quit date: 2023    Years since quittin.4   Smokeless Tobacco Never     CPT Codes:  13146 low dose (must say low dose otherwise is CT without contrast)  / (for patients with [facility] and CMS)      Tobacco Use: Medium Risk (2025)    Patient History     Smoking Tobacco Use: Former     Smokeless Tobacco Use: Never     Passive Exposure: Not on file      E-cigarette/Vaping    E-cigarette/Vaping Use Never User      E-cigarette/Vaping Substances    Nicotine No     THC No     CBD No     Flavoring No      E-cigarette/Vaping Devices     "Disposable No     Pre-filled or Refillable Cartridge No     Refillable Tank No     Pre-filled Pod No        Alcohol Use: Not At Risk (1/8/2024)    Received from [facility]    AUDIT-C     Frequency of Alcohol Consumption: Never     Average Number of Drinks: Patient does not drink     Frequency of Binge Drinking: Never         Objective   Vital Signs:   Vitals:    04/17/25 1354 04/17/25 1359   BP: 157/76 160/82   BP Location: Left arm    Patient Position: Sitting    Cuff Size: Adult    Pulse: 97    Temp: 97.7 °F (36.5 °C)    SpO2: 97%    Weight: Comment: Unable to weigh    Height: 157.5 cm (62\")    PainSc: 0-No pain      Body mass index is 24.27 kg/m².    Wt Readings from Last 3 Encounters:   02/21/25 60.2 kg (132 lb 11.2 oz)   01/09/25 60.7 kg (133 lb 12.8 oz)   10/10/24 62.5 kg (137 lb 11.2 oz)     BP Readings from Last 3 Encounters:   04/17/25 160/82   02/21/25 148/80   01/09/25 170/88       Health Maintenance   Topic Date Due    PAP SMEAR  Never done    LUNG CANCER SCREENING  01/09/2025    COVID-19 Vaccine (1 - 2024-25 season) 10/10/2025 (Originally 9/1/2024)    ZOSTER VACCINE (1 of 2) 10/14/2025 (Originally 8/14/2011)    INFLUENZA VACCINE  07/01/2025    ANNUAL PHYSICAL  10/10/2025    LIPID PANEL  02/21/2026    MAMMOGRAM  04/14/2027    COLORECTAL CANCER SCREENING  04/17/2027    TDAP/TD VACCINES (3 - Td or Tdap) 01/18/2034    HEPATITIS C SCREENING  Completed    Pneumococcal Vaccine 50+  Completed       /82   Pulse 97   Temp 97.7 °F (36.5 °C)   Ht 157.5 cm (62\")   SpO2 97%   BMI 24.27 kg/m²       Current Outpatient Medications:     albuterol sulfate  (90 Base) MCG/ACT inhaler, Inhale 1 puff Every 4 (Four) Hours As Needed for Wheezing., Disp: , Rfl:     ARIPiprazole (ABILIFY) 10 MG tablet, Take 1 tablet by mouth Daily. Indications: Major Depressive Disorder, Disp: 90 tablet, Rfl: 1    busPIRone (BUSPAR) 5 MG tablet, Take 1 tablet by mouth 3 (Three) Times a Day As Needed (anxiety). for anxiety  " Indications: Anxiety Disorder, Disp: 90 tablet, Rfl: 2    carvedilol (COREG) 12.5 MG tablet, Take 1 tablet by mouth 2 (Two) Times a Day With Meals. Indications: High Blood Pressure, Disp: 180 tablet, Rfl: 1    doxycycline (MONODOX) 100 MG capsule, Take 1 capsule by mouth Every 12 (Twelve) Hours., Disp: , Rfl:     febuxostat (ULORIC) 40 MG tablet, Take 1 tablet by mouth Daily. Indications: gout, Disp: 30 tablet, Rfl: 2    FLUoxetine (PROzac) 40 MG capsule, Take 1 capsule by mouth Daily. Indications: Depression, anxiety, Disp: 90 capsule, Rfl: 1    furosemide (LASIX) 40 MG tablet, Take 1 tablet by mouth Daily. take up to 2x/day when swelling is worse  Indications: Edema, Disp: 100 tablet, Rfl: 1    lisinopril (PRINIVIL,ZESTRIL) 20 MG tablet, Take 1 tablet by mouth Daily. (Patient taking differently: Take 1 tablet by mouth 2 (Two) Times a Day.), Disp: , Rfl:     Omega-3 Fatty Acids (OMEGA-3 PO), Take  by mouth., Disp: , Rfl:     Umeclidinium-Vilanterol (Anoro Ellipta) 62.5-25 MCG/ACT aerosol powder  inhaler, Inhale 1 puff Daily., Disp: 60 each, Rfl: 5   Past Medical History:   Diagnosis Date    Anxiety     COPD (chronic obstructive pulmonary disease) 1/8/2024    Depression     Hyperlipidemia     Hypertension     Scoliosis         Physical Exam  Vitals reviewed.   Constitutional:       Appearance: Normal appearance. She is well-developed.   Neck:      Thyroid: No thyroid mass, thyromegaly or thyroid tenderness.   Cardiovascular:      Rate and Rhythm: Normal rate and regular rhythm.      Heart sounds: No murmur heard.     No friction rub. No gallop.   Pulmonary:      Effort: Pulmonary effort is normal.      Breath sounds: Normal breath sounds. No wheezing or rhonchi.   Musculoskeletal:      Right lower leg: No edema.      Left lower leg: No edema.   Lymphadenopathy:      Cervical: No cervical adenopathy.   Skin:     General: Skin is warm and dry.   Neurological:      Mental Status: She is alert and oriented to person,  place, and time.      Cranial Nerves: No cranial nerve deficit.   Psychiatric:         Mood and Affect: Mood and affect normal.         Behavior: Behavior normal.         Thought Content: Thought content normal. Thought content does not include homicidal or suicidal ideation.         Judgment: Judgment normal.            Physical Exam  Respiratory: Clear to auscultation, no wheezing, rales or rhonchi      Result Review :    The following data was reviewed by: DEANNA Pisano on 04/17/2025:  Common Labs   Common labs          2/21/2025    14:32   Common Labs   Glucose 85    BUN 26    Creatinine 1.81    Sodium 137    Potassium 4.8    Chloride 98    Calcium 10.1    WBC 12.68    Hemoglobin 8.7    Hematocrit 26.5    Platelets 242    Total Cholesterol 205    Triglycerides 96    HDL Cholesterol 76    LDL Cholesterol  112    Uric Acid 5.0             XR Chest 1 View  Result Date: 12/18/2024  COPD Electronically Signed: Garrett Lino MD 2024/12/18 at 11:54 CST Reading Location ID and State: [address] [place] [address] Tel [phone], Service support  [phone], Fax [phone]      Results  Labs   - Uric Acid Level: 02/2025, Decreased    Imaging   - Bone Density Scan: Osteopenia      Assessment & Plan  Primary hypertension      Orders:    carvedilol (COREG) 12.5 MG tablet; Take 1 tablet by mouth 2 (Two) Times a Day With Meals. Indications: High Blood Pressure    Major depressive disorder, recurrent, moderate      Orders:    FLUoxetine (PROzac) 40 MG capsule; Take 1 capsule by mouth Daily. Indications: Depression, anxiety    ARIPiprazole (ABILIFY) 10 MG tablet; Take 1 tablet by mouth Daily. Indications: Major Depressive Disorder    SUMMER (generalized anxiety disorder)      Orders:    FLUoxetine (PROzac) 40 MG capsule; Take 1 capsule by mouth Daily. Indications: Depression, anxiety    busPIRone (BUSPAR) 5 MG tablet; Take 1 tablet by mouth 3 (Three) Times a Day As Needed (anxiety). for anxiety  Indications: Anxiety  Disorder    Chronic obstructive pulmonary disease, unspecified COPD type    Gout of foot, unspecified cause, unspecified chronicity, unspecified laterality    Encounter for screening for lung cancer    Orders:     CT Chest Low Dose Cancer Screening WO; Future      Assessment & Plan  1. Depression.  - Current regimen of Abilify 10 mg and Prozac 40 mg appears to be effectively managing depression.  - Dosage of Prozac will be maintained at 40 mg.  - Discussion included the effectiveness of Abilify and Prozac in managing symptoms.  - Continue current medications without changes.    2. Anxiety.  - Currently on BuSpar for anxiety, which is working well.  - No changes to this medication are necessary.  - Reviewed the effectiveness of BuSpar in managing anxiety symptoms.  - Continue current medication regimen.    3. Hypertension.  - Blood pressure remains elevated despite being on carvedilol and lisinopril.  - Physical exam noted elevated blood pressure readings.  - Discussed increasing the dosage of carvedilol to better control blood pressure, but patient does not want to adjust her medication doses at this time.  She states that CBD Gummies are helping keep her blood pressure controlled.  - Advise monitoring blood pressure at home and report any significant changes.     4. Chronic obstructive pulmonary disease (COPD).  - On Anoro daily and has an albuterol inhaler for use as needed.  - No changes to her COPD management plan are necessary at this time.  - Reviewed current medications and their effectiveness in managing COPD symptoms.  - Continue current COPD management plan.    5. Gout.  - Uric acid levels have shown a decrease, indicating that the current medication regimen is effective.  - Recent lab results indicate improved uric acid levels.  - Continue taking Uloric daily as prescribed.  - No changes to current medication regimen.    6. Osteopenia.  - Bone density scan revealed osteopenia, placing her at moderate risk  for fractures.  - Advised to take calcium and vitamin D supplements.  - Discussed the results of the bone density scan and the importance of supplementation.  - Repeat bone density scan recommended in 2 years.    7. Health maintenance.  - Due for a lung cancer screening and a Pap smear.  - CT scan of the chest will be ordered for lung cancer screening.  - Discussed the need for lung cancer screening and Pap smear.  - Order CT scan for lung cancer screening; patient will consider Pap smear and inform us of her decision.    8. Medication management.  - Reported side effects from amitriptyline, which made her sick to her stomach.  - Amitriptyline will be added to her allergy list to avoid future prescriptions.  - Discussed proper disposal of amitriptyline and added it to the allergy list.  - Currently taking Lasix as needed for foot swelling; a refill for Lasix will be provided with instructions to take up to two times per day when swelling increases.    Follow-up  Follow-up in 6 months.        BMI is within normal parameters. No other follow-up for BMI required.        Diagnosis Plan   1. Primary hypertension  carvedilol (COREG) 12.5 MG tablet      2. Major depressive disorder, recurrent, moderate  FLUoxetine (PROzac) 40 MG capsule    ARIPiprazole (ABILIFY) 10 MG tablet      3. SUMMER (generalized anxiety disorder)  FLUoxetine (PROzac) 40 MG capsule    busPIRone (BUSPAR) 5 MG tablet      4. Chronic obstructive pulmonary disease, unspecified COPD type        5. Gout of foot, unspecified cause, unspecified chronicity, unspecified laterality        6. Encounter for screening for lung cancer   CT Chest Low Dose Cancer Screening WO            FOLLOW UP  Return in about 6 months (around 10/17/2025) for Next scheduled follow up.  Patient was given instructions and counseling regarding her condition or for health maintenance advice. Please see specific information pulled into the AVS if appropriate.       CURRENT & DISCONTINUED  MEDICATIONS  Current Outpatient Medications   Medication Instructions    albuterol sulfate  (90 Base) MCG/ACT inhaler 1 puff, Every 4 Hours PRN    ARIPiprazole (ABILIFY) 10 mg, Oral, Daily    busPIRone (BUSPAR) 5 mg, Oral, 3 Times Daily PRN, for anxiety    carvedilol (COREG) 12.5 mg, Oral, 2 Times Daily With Meals    doxycycline (MONODOX) 100 MG capsule 1 capsule, Every 12 Hours Scheduled    febuxostat (ULORIC) 40 mg, Oral, Daily    FLUoxetine (PROZAC) 40 mg, Oral, Daily    furosemide (LASIX) 40 mg, Oral, Daily, take up to 2x/day when swelling is worse    lisinopril (PRINIVIL,ZESTRIL) 20 mg, Daily    Omega-3 Fatty Acids (OMEGA-3 PO) Take  by mouth.    Umeclidinium-Vilanterol (Anoro Ellipta) 62.5-25 MCG/ACT aerosol powder  inhaler 1 puff, Inhalation, Daily - RT       Medications Discontinued During This Encounter   Medication Reason    amitriptyline (ELAVIL) 10 MG tablet Side effects    FLUoxetine (PROzac) 40 MG capsule Reorder    ARIPiprazole (ABILIFY) 10 MG tablet Reorder    carvedilol (COREG) 12.5 MG tablet Reorder    furosemide (LASIX) 40 MG tablet Reorder    busPIRone (BUSPAR) 5 MG tablet Reorder        Parts of this note are electronic transcriptions/translations of spoken language to printed text using the Dragon Dictation system.    Trinh Soto, APRN  04/17/25  15:07 EDT

## 2025-04-17 ENCOUNTER — OFFICE VISIT (OUTPATIENT)
Dept: FAMILY MEDICINE CLINIC | Facility: CLINIC | Age: 65
End: 2025-04-17
Payer: COMMERCIAL

## 2025-04-17 VITALS
SYSTOLIC BLOOD PRESSURE: 160 MMHG | BODY MASS INDEX: 24.27 KG/M2 | DIASTOLIC BLOOD PRESSURE: 82 MMHG | TEMPERATURE: 97.7 F | HEART RATE: 97 BPM | HEIGHT: 62 IN | OXYGEN SATURATION: 97 %

## 2025-04-17 DIAGNOSIS — Z12.2 ENCOUNTER FOR SCREENING FOR LUNG CANCER: ICD-10-CM

## 2025-04-17 DIAGNOSIS — M10.9 GOUT OF FOOT, UNSPECIFIED CAUSE, UNSPECIFIED CHRONICITY, UNSPECIFIED LATERALITY: ICD-10-CM

## 2025-04-17 DIAGNOSIS — J44.9 CHRONIC OBSTRUCTIVE PULMONARY DISEASE, UNSPECIFIED COPD TYPE: ICD-10-CM

## 2025-04-17 DIAGNOSIS — F41.1 GAD (GENERALIZED ANXIETY DISORDER): ICD-10-CM

## 2025-04-17 DIAGNOSIS — F33.1 MAJOR DEPRESSIVE DISORDER, RECURRENT, MODERATE: ICD-10-CM

## 2025-04-17 DIAGNOSIS — I10 PRIMARY HYPERTENSION: Primary | ICD-10-CM

## 2025-04-17 PROBLEM — R09.02 HYPOXEMIA: Status: ACTIVE | Noted: 2025-04-07

## 2025-04-17 PROBLEM — J43.1 PANLOBULAR EMPHYSEMA: Status: ACTIVE | Noted: 2024-05-14

## 2025-04-17 PROCEDURE — 99214 OFFICE O/P EST MOD 30 MIN: CPT | Performed by: NURSE PRACTITIONER

## 2025-04-17 RX ORDER — FUROSEMIDE 40 MG/1
40 TABLET ORAL DAILY
Qty: 100 TABLET | Refills: 1 | Status: SHIPPED | OUTPATIENT
Start: 2025-04-17

## 2025-04-17 RX ORDER — UMECLIDINIUM BROMIDE AND VILANTEROL TRIFENATATE 62.5; 25 UG/1; UG/1
1 POWDER RESPIRATORY (INHALATION)
Qty: 60 EACH | Refills: 5 | Status: CANCELLED | OUTPATIENT
Start: 2025-04-17

## 2025-04-17 RX ORDER — FLUOXETINE HYDROCHLORIDE 40 MG/1
40 CAPSULE ORAL DAILY
Qty: 90 CAPSULE | Refills: 1 | Status: SHIPPED | OUTPATIENT
Start: 2025-04-17

## 2025-04-17 RX ORDER — ARIPIPRAZOLE 10 MG/1
10 TABLET ORAL DAILY
Qty: 90 TABLET | Refills: 1 | Status: SHIPPED | OUTPATIENT
Start: 2025-04-17

## 2025-04-17 RX ORDER — BUSPIRONE HYDROCHLORIDE 5 MG/1
5 TABLET ORAL 3 TIMES DAILY PRN
Qty: 90 TABLET | Refills: 2 | Status: SHIPPED | OUTPATIENT
Start: 2025-04-17

## 2025-04-17 RX ORDER — CARVEDILOL 12.5 MG/1
12.5 TABLET ORAL 2 TIMES DAILY WITH MEALS
Qty: 180 TABLET | Refills: 1 | Status: SHIPPED | OUTPATIENT
Start: 2025-04-17

## 2025-04-17 NOTE — ASSESSMENT & PLAN NOTE
Orders:    FLUoxetine (PROzac) 40 MG capsule; Take 1 capsule by mouth Daily. Indications: Depression, anxiety    ARIPiprazole (ABILIFY) 10 MG tablet; Take 1 tablet by mouth Daily. Indications: Major Depressive Disorder

## 2025-04-17 NOTE — ASSESSMENT & PLAN NOTE
Orders:    FLUoxetine (PROzac) 40 MG capsule; Take 1 capsule by mouth Daily. Indications: Depression, anxiety    busPIRone (BUSPAR) 5 MG tablet; Take 1 tablet by mouth 3 (Three) Times a Day As Needed (anxiety). for anxiety  Indications: Anxiety Disorder

## 2025-04-17 NOTE — ASSESSMENT & PLAN NOTE
Orders:    carvedilol (COREG) 12.5 MG tablet; Take 1 tablet by mouth 2 (Two) Times a Day With Meals. Indications: High Blood Pressure

## 2025-07-28 DIAGNOSIS — I10 ESSENTIAL (PRIMARY) HYPERTENSION: ICD-10-CM

## 2025-07-28 DIAGNOSIS — E79.0 HYPERURICEMIA: ICD-10-CM

## 2025-07-28 RX ORDER — LISINOPRIL 20 MG/1
20 TABLET ORAL 2 TIMES DAILY
Qty: 180 TABLET | Refills: 0 | Status: SHIPPED | OUTPATIENT
Start: 2025-07-28

## 2025-07-28 RX ORDER — FEBUXOSTAT 40 MG/1
TABLET, FILM COATED ORAL
Qty: 90 TABLET | Refills: 0 | Status: SHIPPED | OUTPATIENT
Start: 2025-07-28

## 2025-08-07 ENCOUNTER — TELEPHONE (OUTPATIENT)
Dept: FAMILY MEDICINE CLINIC | Facility: CLINIC | Age: 65
End: 2025-08-07
Payer: COMMERCIAL

## 2025-08-07 DIAGNOSIS — J44.9 CHRONIC OBSTRUCTIVE PULMONARY DISEASE, UNSPECIFIED COPD TYPE: Primary | ICD-10-CM

## 2025-08-07 DIAGNOSIS — J44.9 CHRONIC OBSTRUCTIVE PULMONARY DISEASE, UNSPECIFIED COPD TYPE: ICD-10-CM

## 2025-08-07 RX ORDER — UMECLIDINIUM BROMIDE AND VILANTEROL TRIFENATATE 62.5; 25 UG/1; UG/1
1 POWDER RESPIRATORY (INHALATION)
Qty: 60 EACH | Refills: 5 | Status: SHIPPED | OUTPATIENT
Start: 2025-08-07

## 2025-08-07 RX ORDER — ALBUTEROL SULFATE 90 UG/1
1 INHALANT RESPIRATORY (INHALATION) EVERY 4 HOURS PRN
Qty: 18 G | Refills: 5 | Status: SHIPPED | OUTPATIENT
Start: 2025-08-07